# Patient Record
Sex: MALE | Race: BLACK OR AFRICAN AMERICAN | NOT HISPANIC OR LATINO | Employment: OTHER | URBAN - METROPOLITAN AREA
[De-identification: names, ages, dates, MRNs, and addresses within clinical notes are randomized per-mention and may not be internally consistent; named-entity substitution may affect disease eponyms.]

---

## 2024-08-29 ENCOUNTER — OFFICE VISIT (OUTPATIENT)
Dept: FAMILY MEDICINE | Facility: CLINIC | Age: 49
End: 2024-08-29
Payer: COMMERCIAL

## 2024-08-29 VITALS
HEART RATE: 81 BPM | BODY MASS INDEX: 25.71 KG/M2 | TEMPERATURE: 97.3 F | DIASTOLIC BLOOD PRESSURE: 83 MMHG | SYSTOLIC BLOOD PRESSURE: 126 MMHG | OXYGEN SATURATION: 97 % | RESPIRATION RATE: 16 BRPM | WEIGHT: 160 LBS | HEIGHT: 66 IN

## 2024-08-29 DIAGNOSIS — I10 HYPERTENSION GOAL BP (BLOOD PRESSURE) < 140/90: ICD-10-CM

## 2024-08-29 DIAGNOSIS — E11.65 TYPE 2 DIABETES MELLITUS WITH HYPERGLYCEMIA, WITHOUT LONG-TERM CURRENT USE OF INSULIN (H): ICD-10-CM

## 2024-08-29 DIAGNOSIS — Z12.5 PROSTATE CANCER SCREENING: ICD-10-CM

## 2024-08-29 DIAGNOSIS — M65.30 TRIGGER FINGER, ACQUIRED: ICD-10-CM

## 2024-08-29 DIAGNOSIS — Z76.89 ENCOUNTER TO ESTABLISH CARE: Primary | ICD-10-CM

## 2024-08-29 DIAGNOSIS — E78.5 HYPERLIPIDEMIA LDL GOAL <100: ICD-10-CM

## 2024-08-29 LAB
ALBUMIN SERPL BCG-MCNC: 4.8 G/DL (ref 3.5–5.2)
ALP SERPL-CCNC: 116 U/L (ref 40–150)
ALT SERPL W P-5'-P-CCNC: 68 U/L (ref 0–70)
ANION GAP SERPL CALCULATED.3IONS-SCNC: 11 MMOL/L (ref 7–15)
AST SERPL W P-5'-P-CCNC: 43 U/L (ref 0–45)
BILIRUB SERPL-MCNC: 0.5 MG/DL
BUN SERPL-MCNC: 9.3 MG/DL (ref 6–20)
CALCIUM SERPL-MCNC: 9.4 MG/DL (ref 8.8–10.4)
CHLORIDE SERPL-SCNC: 101 MMOL/L (ref 98–107)
CHOLEST SERPL-MCNC: 249 MG/DL
CREAT SERPL-MCNC: 0.71 MG/DL (ref 0.67–1.17)
CREAT UR-MCNC: 203 MG/DL
EGFRCR SERPLBLD CKD-EPI 2021: >90 ML/MIN/1.73M2
ERYTHROCYTE [DISTWIDTH] IN BLOOD BY AUTOMATED COUNT: 11.4 % (ref 10–15)
FASTING STATUS PATIENT QL REPORTED: YES
FASTING STATUS PATIENT QL REPORTED: YES
GLUCOSE SERPL-MCNC: 197 MG/DL (ref 70–99)
HBA1C MFR BLD: 8.6 % (ref 0–5.6)
HCO3 SERPL-SCNC: 25 MMOL/L (ref 22–29)
HCT VFR BLD AUTO: 45.7 % (ref 40–53)
HDLC SERPL-MCNC: 41 MG/DL
HGB BLD-MCNC: 16.5 G/DL (ref 13.3–17.7)
LDLC SERPL CALC-MCNC: 183 MG/DL
MCH RBC QN AUTO: 30.1 PG (ref 26.5–33)
MCHC RBC AUTO-ENTMCNC: 36.1 G/DL (ref 31.5–36.5)
MCV RBC AUTO: 83 FL (ref 78–100)
MICROALBUMIN UR-MCNC: 23.4 MG/L
MICROALBUMIN/CREAT UR: 11.53 MG/G CR (ref 0–17)
NONHDLC SERPL-MCNC: 208 MG/DL
PLATELET # BLD AUTO: 238 10E3/UL (ref 150–450)
POTASSIUM SERPL-SCNC: 3.5 MMOL/L (ref 3.4–5.3)
PROT SERPL-MCNC: 7.4 G/DL (ref 6.4–8.3)
PSA SERPL DL<=0.01 NG/ML-MCNC: 0.46 NG/ML (ref 0–3.5)
RBC # BLD AUTO: 5.48 10E6/UL (ref 4.4–5.9)
SODIUM SERPL-SCNC: 137 MMOL/L (ref 135–145)
TRIGL SERPL-MCNC: 127 MG/DL
WBC # BLD AUTO: 5.7 10E3/UL (ref 4–11)

## 2024-08-29 PROCEDURE — 85027 COMPLETE CBC AUTOMATED: CPT | Performed by: INTERNAL MEDICINE

## 2024-08-29 PROCEDURE — 36415 COLL VENOUS BLD VENIPUNCTURE: CPT | Performed by: INTERNAL MEDICINE

## 2024-08-29 PROCEDURE — 90471 IMMUNIZATION ADMIN: CPT | Performed by: INTERNAL MEDICINE

## 2024-08-29 PROCEDURE — G0103 PSA SCREENING: HCPCS | Performed by: INTERNAL MEDICINE

## 2024-08-29 PROCEDURE — 99203 OFFICE O/P NEW LOW 30 MIN: CPT | Mod: 25 | Performed by: INTERNAL MEDICINE

## 2024-08-29 PROCEDURE — 83036 HEMOGLOBIN GLYCOSYLATED A1C: CPT | Performed by: INTERNAL MEDICINE

## 2024-08-29 PROCEDURE — 80053 COMPREHEN METABOLIC PANEL: CPT | Performed by: INTERNAL MEDICINE

## 2024-08-29 PROCEDURE — 82570 ASSAY OF URINE CREATININE: CPT | Performed by: INTERNAL MEDICINE

## 2024-08-29 PROCEDURE — 82043 UR ALBUMIN QUANTITATIVE: CPT | Performed by: INTERNAL MEDICINE

## 2024-08-29 PROCEDURE — 80061 LIPID PANEL: CPT | Performed by: INTERNAL MEDICINE

## 2024-08-29 PROCEDURE — 90746 HEPB VACCINE 3 DOSE ADULT IM: CPT | Performed by: INTERNAL MEDICINE

## 2024-08-29 RX ORDER — AMLODIPINE BESYLATE 5 MG/1
5 TABLET ORAL DAILY
Qty: 90 TABLET | Refills: 0 | Status: SHIPPED | OUTPATIENT
Start: 2024-08-29

## 2024-08-29 RX ORDER — AMLODIPINE BESYLATE 5 MG/1
5 TABLET ORAL DAILY
COMMUNITY
Start: 2024-08-29 | End: 2024-08-29

## 2024-08-29 ASSESSMENT — PAIN SCALES - GENERAL: PAINLEVEL: MILD PAIN (3)

## 2024-08-29 NOTE — NURSING NOTE
Prior to immunization administration, verified patients identity using patient s name and date of birth. Please see Immunization Activity for additional information.     Screening Questionnaire for Adult Immunization    Are you sick today?   No   Do you have allergies to medications, food, a vaccine component or latex?   No   Have you ever had a serious reaction after receiving a vaccination?   No   Do you have a long-term health problem with heart, lung, kidney, or metabolic disease (e.g., diabetes), asthma, a blood disorder, no spleen, complement component deficiency, a cochlear implant, or a spinal fluid leak?  Are you on long-term aspirin therapy?   No   Do you have cancer, leukemia, HIV/AIDS, or any other immune system problem?   No   Do you have a parent, brother, or sister with an immune system problem?   No   In the past 3 months, have you taken medications that affect  your immune system, such as prednisone, other steroids, or anticancer drugs; drugs for the treatment of rheumatoid arthritis, Crohn s disease, or psoriasis; or have you had radiation treatments?   No   Have you had a seizure, or a brain or other nervous system problem?   No   During the past year, have you received a transfusion of blood or blood    products, or been given immune (gamma) globulin or antiviral drug?   No   For women: Are you pregnant or is there a chance you could become       pregnant during the next month?   No   Have you received any vaccinations in the past 4 weeks?   No     Immunization questionnaire answers were all negative.      Patient instructed to remain in clinic for 15 minutes afterwards, and to report any adverse reactions.     Screening performed by Louann Deal MA on 8/29/2024 at 1:27 PM.

## 2024-08-29 NOTE — PROGRESS NOTES
"  Assessment & Plan     Toshia was seen today for hypertension.    Diagnoses and all orders for this visit:    Encounter to establish care    Hyperlipidemia LDL goal <100  -     Comprehensive metabolic panel; Future  -     Lipid Profile; Future  -     Comprehensive metabolic panel  -     Lipid Profile  -     rosuvastatin (CRESTOR) 20 MG tablet; Take 1 tablet (20 mg) by mouth daily.  -     Lipid panel reflex to direct LDL Fasting; Future    Hypertension goal BP (blood pressure) < 140/90  -     amLODIPine (NORVASC) 5 MG tablet; Take 1 tablet (5 mg) by mouth daily.    Prostate cancer screening  -     PSA, screen; Future  -     PSA, screen    Trigger finger, acquired  -     Orthopedic  Referral; Future    Type 2 diabetes mellitus with hyperglycemia, without long-term current use of insulin (H)  -     Hemoglobin A1c; Future  -     CBC with platelets; Future  -     Comprehensive metabolic panel; Future  -     Albumin Random Urine Quantitative with Creat Ratio; Future  -     Hemoglobin A1c  -     CBC with platelets  -     Comprehensive metabolic panel  -     Albumin Random Urine Quantitative with Creat Ratio  -     metFORMIN (GLUCOPHAGE XR) 500 MG 24 hr tablet; Take 1 tablet (500 mg) by mouth 2 times daily (with meals).  -     Hemoglobin A1c; Future    Other orders  -     HEPATITIS B VACCINE (20 YEARS AND OLDER) (ENGERIX-B/RECOMBIVAX)  -     PRIMARY CARE FOLLOW-UP SCHEDULING; Future  -     PRIMARY CARE FOLLOW-UP SCHEDULING; Future       Diabetes is not controlled. Start metformin.   LDL not at goal. Start rosuvastatin.  Recheck A1c and lipids in 3 months.  Return in January 2025 for preventive physical.   He can schedule nurse visit for hepatitis B#2 and other vaccines.         BMI  Estimated body mass index is 26.22 kg/m  as calculated from the following:    Height as of this encounter: 1.664 m (5' 5.5\").    Weight as of this encounter: 72.6 kg (160 lb).   Weight management plan: not addressed          Subjective "   Toshia is a 49 year old, presenting for the following health issues:  Hypertension        8/29/2024    12:36 PM   Additional Questions   Roomed by Clif   Accompanied by self         8/29/2024    12:36 PM   Patient Reported Additional Medications   Patient reports taking the following new medications no       Pt moved from [facility] to the [facility] to be close to family.   He has his own business, .   He has had history of diabetes, HTN, hyperlipidemia for over 10 years.   On Amlodipine for HTN.   Diet control for diabetes.   Chose not to be on medication for cholesterol, working on diet. He is now open to take cholesterol medication if recheck is high.   He has strong family history of HTN. His father and 6 of the 9 children in his family including himself have HTN. No other disease runs in the family.     Has trigger finger of the right ring finger for about 8 years. Had injection a couple of years ago with good result but it is now coming back.     He otherwise feels healthy  He needs amlodipine refilled.   His last preventive physical is January 2024.    History of Present Illness       Diabetes:   He presents for follow up of diabetes.    He is not checking blood glucose.        He is concerned about other.    He is not experiencing numbness or burning in feet, excessive thirst, blurry vision, weight changes or redness, sores or blisters on feet.           Hyperlipidemia:  He presents for follow up of hyperlipidemia.   He is not taking medication to lower cholesterol. He is not having myalgia or other side effects to statin medications.    Hypertension: He presents for follow up of hypertension.  He does check blood pressure  regularly outside of the clinic. Outside blood pressures have been over 140/90. He follows a low salt diet.     He eats 0-1 servings of fruits and vegetables daily.He consumes 0 sweetened beverage(s) daily.He exercises with enough effort to increase his heart rate 20 to 29  "minutes per day.  He exercises with enough effort to increase his heart rate 3 or less days per week.   He is taking medications regularly.         Hypertension Follow-up    Do you check your blood pressure regularly outside of the clinic? Yes   Are you following a low salt diet? Yes  Are your blood pressures ever more than 140 on the top number (systolic) OR more   than 90 on the bottom number (diastolic), for example 140/90? Yes  How many servings of fruits and vegetables do you eat daily? 0-1   On average, how many sweetened beverages do you drink each day (Examples: soda, juice, sweet tea, etc.  Do NOT count diet or artificially sweetened beverages)? 0   How many minutes a day do you exercise enough to make your heart beat faster? 20 to 29   How many days a week do you exercise enough to make your heart beat faster? 3 or less   How many days per week do you miss taking your medication? 0         Review of Systems  Constitutional, HEENT, cardiovascular, pulmonary, gi and gu systems are negative, except as otherwise noted.      Objective    /83 (BP Location: Right arm, Patient Position: Sitting, Cuff Size: Adult Regular)   Pulse 81   Temp 97.3  F (36.3  C) (Temporal)   Resp 16   Ht 1.664 m (5' 5.5\")   Wt 72.6 kg (160 lb)   SpO2 97%   BMI 26.22 kg/m    Body mass index is 26.22 kg/m . Second BP: 126/83  Physical Exam   GENERAL: alert and no distress    Results for orders placed or performed in visit on 08/29/24   Hemoglobin A1c     Status: Abnormal   Result Value Ref Range    Hemoglobin A1C 8.6 (H) 0.0 - 5.6 %   CBC with platelets     Status: Normal   Result Value Ref Range    WBC Count 5.7 4.0 - 11.0 10e3/uL    RBC Count 5.48 4.40 - 5.90 10e6/uL    Hemoglobin 16.5 13.3 - 17.7 g/dL    Hematocrit 45.7 40.0 - 53.0 %    MCV 83 78 - 100 fL    MCH 30.1 26.5 - 33.0 pg    MCHC 36.1 31.5 - 36.5 g/dL    RDW 11.4 10.0 - 15.0 %    Platelet Count 238 150 - 450 10e3/uL   Comprehensive metabolic panel     Status: " Abnormal   Result Value Ref Range    Sodium 137 135 - 145 mmol/L    Potassium 3.5 3.4 - 5.3 mmol/L    Carbon Dioxide (CO2) 25 22 - 29 mmol/L    Anion Gap 11 7 - 15 mmol/L    Urea Nitrogen 9.3 6.0 - 20.0 mg/dL    Creatinine 0.71 0.67 - 1.17 mg/dL    GFR Estimate >90 >60 mL/min/1.73m2    Calcium 9.4 8.8 - 10.4 mg/dL    Chloride 101 98 - 107 mmol/L    Glucose 197 (H) 70 - 99 mg/dL    Alkaline Phosphatase 116 40 - 150 U/L    AST 43 0 - 45 U/L    ALT 68 0 - 70 U/L    Protein Total 7.4 6.4 - 8.3 g/dL    Albumin 4.8 3.5 - 5.2 g/dL    Bilirubin Total 0.5 <=1.2 mg/dL    Patient Fasting > 8hrs? Yes    Albumin Random Urine Quantitative with Creat Ratio     Status: None   Result Value Ref Range    Creatinine Urine mg/dL 203.0 mg/dL    Albumin Urine mg/L 23.4 mg/L    Albumin Urine mg/g Cr 11.53 0.00 - 17.00 mg/g Cr   Lipid Profile     Status: Abnormal   Result Value Ref Range    Cholesterol 249 (H) <200 mg/dL    Triglycerides 127 <150 mg/dL    Direct Measure HDL 41 >=40 mg/dL    LDL Cholesterol Calculated 183 (H) <=100 mg/dL    Non HDL Cholesterol 208 (H) <130 mg/dL    Patient Fasting > 8hrs? Yes     Narrative    Cholesterol  Desirable:  <200 mg/dL    Triglycerides  Normal:  Less than 150 mg/dL  Borderline High:  150-199 mg/dL  High:  200-499 mg/dL  Very High:  Greater than or equal to 500 mg/dL    Direct Measure HDL  Female:  Greater than or equal to 50 mg/dL   Male:  Greater than or equal to 40 mg/dL    LDL Cholesterol  Desirable:  <100mg/dL  Above Desirable:  100-129 mg/dL   Borderline High:  130-159 mg/dL   High:  160-189 mg/dL   Very High:  >= 190 mg/dL    Non HDL Cholesterol  Desirable:  130 mg/dL  Above Desirable:  130-159 mg/dL  Borderline High:  160-189 mg/dL  High:  190-219 mg/dL  Very High:  Greater than or equal to 220 mg/dL   PSA, screen     Status: Normal   Result Value Ref Range    Prostate Specific Antigen Screen 0.46 0.00 - 3.50 ng/mL    Narrative    This result is obtained using the Roche Elecsys total PSA  method on the daja [id number] immunoassay analyzer. Results obtained with different assay methods or kits cannot be used interchangeably.           Signed Electronically by: Abimael Bills MD PhD

## 2024-08-30 DIAGNOSIS — E78.5 HYPERLIPIDEMIA LDL GOAL <100: ICD-10-CM

## 2024-08-30 PROBLEM — E11.9 DIABETES MELLITUS, TYPE 2 (H): Status: ACTIVE | Noted: 2024-08-30

## 2024-08-30 RX ORDER — METFORMIN HCL 500 MG
500 TABLET, EXTENDED RELEASE 24 HR ORAL 2 TIMES DAILY WITH MEALS
Qty: 180 TABLET | Refills: 0 | Status: SHIPPED | OUTPATIENT
Start: 2024-08-30

## 2024-08-30 RX ORDER — ROSUVASTATIN CALCIUM 20 MG/1
20 TABLET, COATED ORAL DAILY
Qty: 90 TABLET | Refills: 0 | Status: SHIPPED | OUTPATIENT
Start: 2024-08-30 | End: 2024-09-02

## 2024-09-02 RX ORDER — ATORVASTATIN CALCIUM 40 MG/1
20 TABLET, FILM COATED ORAL DAILY
Qty: 90 TABLET | Refills: 0 | Status: SHIPPED | OUTPATIENT
Start: 2024-09-02

## 2024-09-03 ENCOUNTER — OFFICE VISIT (OUTPATIENT)
Dept: ORTHOPEDICS | Facility: CLINIC | Age: 49
End: 2024-09-03
Payer: COMMERCIAL

## 2024-09-03 DIAGNOSIS — M65.341 TRIGGER RING FINGER OF RIGHT HAND: Primary | ICD-10-CM

## 2024-09-03 PROCEDURE — 20550 NJX 1 TENDON SHEATH/LIGAMENT: CPT | Mod: F8 | Performed by: FAMILY MEDICINE

## 2024-09-03 PROCEDURE — 99203 OFFICE O/P NEW LOW 30 MIN: CPT | Mod: 25 | Performed by: FAMILY MEDICINE

## 2024-09-03 RX ORDER — ROPIVACAINE HYDROCHLORIDE 5 MG/ML
0.5 INJECTION, SOLUTION EPIDURAL; INFILTRATION; PERINEURAL
Status: SHIPPED | OUTPATIENT
Start: 2024-09-03

## 2024-09-03 RX ORDER — BETAMETHASONE SODIUM PHOSPHATE AND BETAMETHASONE ACETATE 3; 3 MG/ML; MG/ML
6 INJECTION, SUSPENSION INTRA-ARTICULAR; INTRALESIONAL; INTRAMUSCULAR; SOFT TISSUE
Status: SHIPPED | OUTPATIENT
Start: 2024-09-03

## 2024-09-03 RX ADMIN — BETAMETHASONE SODIUM PHOSPHATE AND BETAMETHASONE ACETATE 6 MG: 3; 3 INJECTION, SUSPENSION INTRA-ARTICULAR; INTRALESIONAL; INTRAMUSCULAR; SOFT TISSUE at 10:52

## 2024-09-03 RX ADMIN — ROPIVACAINE HYDROCHLORIDE 0.5 ML: 5 INJECTION, SOLUTION EPIDURAL; INFILTRATION; PERINEURAL at 10:52

## 2024-09-03 NOTE — LETTER
9/3/2024      Toshia Hutchins  [address] [place] [address]      Dear Colleague,    Thank you for referring your patient, Toshia Hutchins, to the [facility]. Please see a copy of my visit note below.    ASSESSMENT & PLAN    Toshia was seen today for pain.    Diagnoses and all orders for this visit:    Trigger ring finger of right hand  -     Hand / Upper Extremity Injection/Arthrocentesis: R ring A1      This issue is acute on chronic and Worsening.    # Right 4th Digit Trigger Finger: Toshia Hutchins  was seen today for right 4th digit. Symptoms had been going on for 10+ years worsening over the past few months. On examination there are positive findings of triggering over the 4th digit. No new imaging. Likely cause of patient's condition due to trigger finger 4th digit.   Counseled patient on nature of condition and treatment options.  Given this plan as below, follow-up as needed.     Image Findings: none  Treatment: Activities as tolerated, home exercises given today  Job: As tolerated  Medications/Injections: Limited tylenol/ibuprofen for pain for 1-2 weeks,Topical Voltaren gel,  right 4th digit trigger finger steroid injection   Follow-up: In one month if symptoms do not improve, sooner if worsening  Can consider repeat evaluation    Raúl Diop MD  [facility]    -----  Chief Complaint   Patient presents with     Right Hand - Pain       SUBJECTIVE  Toshia Hutchins is a/an 49 year old male who is seen in consultation at the request of  Abimael Bills MD, PhD for evaluation of right hand pain.     The patient is seen by themselves.  The patient is Right handed    Onset: 10+ years(s) ago. Reports insidious onset without acute precipitating event. New flare over last 10 months.   Location of Pain: right ring finger   Worsened by: morning   Better with: steroid injection  Treatments tried:  previous injection provided good relief for 2-3  years  Associated symptoms: locking    Orthopedic/Surgical history: YES -chronic finger pain  Social History/Occupation: own business, .     No family history pertinent to patient's problem today.      REVIEW OF SYSTEMS:  Review of Systems  Constitutional, HEENT, cardiovascular, pulmonary, gi and gu systems are negative, except as otherwise noted.    OBJECTIVE:  There were no vitals taken for this visit.   General: healthy, alert and in no distress  HEENT: no scleral icterus or conjunctival erythema  Skin: no suspicious lesions or rash. No jaundice.  CV: distal perfusion intact    Resp: normal respiratory effort without conversational dyspnea   Psych: normal mood and affect  Gait: normal steady gait with appropriate coordination and balance    Neuro: Normal light sensory exam of right upper extremity     Ortho Exam   RIGHT HAND  Inspection:    No swelling, bruising, discoloration, or obvious deformity or asymmetry  Palpation:   Carpals: normal   Metacarpals: normal   Thumb: normal   Fingers: triggering 4th digit  Range of Motion:    Full active flexion and extension at MCP, PIP, and DIP joints; normal finger cascade without malrotation.  Wrist pronation, supination, and ulnar/radial deviation normal.  Strength:     full  Special Tests:    Positive: palpable triggering 4th digit    Negative: flexor digitorum superficialis testing, flexor digitorum profundus testing    RADIOLOGY:  No new imaging       Disclaimer: This note consists of symbols derived from keyboarding, dictation and/or voice recognition software. As a result, there may be errors in the script that have gone undetected. Please consider this when interpreting information found in this chart.    Hand / Upper Extremity Injection/Arthrocentesis: R ring A1    Date/Time: 9/3/2024 10:52 AM    Performed by: Raúl Diop MD  Authorized by: Raúl Diop MD    Indications:  Pain and therapeutic  Needle Size:  25 G  Guidance: landmark     Approach:  Volar  Condition: trigger finger    Location:  Ring finger    Site:  R ring A1  Medications:  6 mg betamethasone acet & sod phos 6 (3-3) MG/ML; 0.5 mL ROPivacaine 5 MG/ML  Medications comment:  Actual amount of celestone used 0.5 mL  Outcome:  Tolerated well, no immediate complications  Procedure discussed: discussed risks, benefits, and alternatives    Consent Given by:  Patient  Timeout: timeout called immediately prior to procedure    Prep: patient was prepped and draped in usual sterile fashion     Ultrasound images of procedure were permanently stored.     Patient tolerated right 4th digit trigger finger steroid injection.  Ultrasound guided images were permanently stored.   Aftercare instructions given to patient.  Plan to follow-up as discussed above.     Raúl Diop MD [facility]            Again, thank you for allowing me to participate in the care of your patient.        Sincerely,        Raúl Diop MD

## 2024-09-03 NOTE — PROGRESS NOTES
ASSESSMENT & PLAN    Toshia was seen today for pain.    Diagnoses and all orders for this visit:    Trigger ring finger of right hand  -     Hand / Upper Extremity Injection/Arthrocentesis: R ring A1      This issue is acute on chronic and Worsening.    # Right 4th Digit Trigger Finger: Toshia Hutchins  was seen today for right 4th digit. Symptoms had been going on for 10+ years worsening over the past few months. On examination there are positive findings of triggering over the 4th digit. No new imaging. Likely cause of patient's condition due to trigger finger 4th digit.   Counseled patient on nature of condition and treatment options.  Given this plan as below, follow-up as needed.     Image Findings: none  Treatment: Activities as tolerated, home exercises given today  Job: As tolerated  Medications/Injections: Limited tylenol/ibuprofen for pain for 1-2 weeks,Topical Voltaren gel,  right 4th digit trigger finger steroid injection   Follow-up: In one month if symptoms do not improve, sooner if worsening  Can consider repeat evaluation    Raúl Diop MD  [facility] [place]    -----  Chief Complaint   Patient presents with    Right Hand - Pain       SUBJECTIVE  Toshia Hutchins is a/an 49 year old male who is seen in consultation at the request of  Abimael Bills MD, PhD for evaluation of right hand pain.     The patient is seen by themselves.  The patient is Right handed    Onset: 10+ years(s) ago. Reports insidious onset without acute precipitating event. New flare over last 10 months.   Location of Pain: right ring finger   Worsened by: morning   Better with: steroid injection  Treatments tried:  previous injection provided good relief for 2-3 years  Associated symptoms: locking    Orthopedic/Surgical history: YES -chronic finger pain  Social History/Occupation: own business, .     No family history pertinent to patient's problem today.      REVIEW OF SYSTEMS:  Review of  Systems  Constitutional, HEENT, cardiovascular, pulmonary, gi and gu systems are negative, except as otherwise noted.    OBJECTIVE:  There were no vitals taken for this visit.   General: healthy, alert and in no distress  HEENT: no scleral icterus or conjunctival erythema  Skin: no suspicious lesions or rash. No jaundice.  CV: distal perfusion intact    Resp: normal respiratory effort without conversational dyspnea   Psych: normal mood and affect  Gait: normal steady gait with appropriate coordination and balance    Neuro: Normal light sensory exam of right upper extremity     Ortho Exam   RIGHT HAND  Inspection:    No swelling, bruising, discoloration, or obvious deformity or asymmetry  Palpation:   Carpals: normal   Metacarpals: normal   Thumb: normal   Fingers: triggering 4th digit  Range of Motion:    Full active flexion and extension at MCP, PIP, and DIP joints; normal finger cascade without malrotation.  Wrist pronation, supination, and ulnar/radial deviation normal.  Strength:     full  Special Tests:    Positive: palpable triggering 4th digit    Negative: flexor digitorum superficialis testing, flexor digitorum profundus testing    RADIOLOGY:  No new imaging       Disclaimer: This note consists of symbols derived from keyboarding, dictation and/or voice recognition software. As a result, there may be errors in the script that have gone undetected. Please consider this when interpreting information found in this chart.    Hand / Upper Extremity Injection/Arthrocentesis: R ring A1    Date/Time: 9/3/2024 10:52 AM    Performed by: Raúl Diop MD  Authorized by: Raúl Diop MD    Indications:  Pain and therapeutic  Needle Size:  25 G  Guidance: landmark    Approach:  Volar  Condition: trigger finger    Location:  Ring finger    Site:  R ring A1  Medications:  6 mg betamethasone acet & sod phos 6 (3-3) MG/ML; 0.5 mL ROPivacaine 5 MG/ML  Medications comment:  Actual amount of celestone used 0.5  mL  Outcome:  Tolerated well, no immediate complications  Procedure discussed: discussed risks, benefits, and alternatives    Consent Given by:  Patient  Timeout: timeout called immediately prior to procedure    Prep: patient was prepped and draped in usual sterile fashion     Ultrasound images of procedure were permanently stored.     Patient tolerated right 4th digit trigger finger steroid injection.  Ultrasound guided images were permanently stored.   Aftercare instructions given to patient.  Plan to follow-up as discussed above.     Raúl Diop MD [facility]

## 2024-09-03 NOTE — PATIENT INSTRUCTIONS
# Right 4th Digit Trigger Finger: Toshia Hutchins  was seen today for right 4th digit. Symptoms had been going on for 10+ years worsening over the past few months. On examination there are positive findings of triggering over the 4th digit. No new imaging. Likely cause of patient's condition due to trigger finger 4th digit.   Counseled patient on nature of condition and treatment options.  Given this plan as below, follow-up as needed.     Image Findings: none  Treatment: Activities as tolerated, home exercises given today  Job: As tolerated  Medications/Injections: Limited tylenol/ibuprofen for pain for 1-2 weeks,Topical Voltaren gel,  right 4th digit trigger finger steroid injection   Follow-up: In one month if symptoms do not improve, sooner if worsening  Can consider repeat evaluation    Please call [phone]   Ask for my team if you have any questions or concerns    If you have not yet received the influenza vaccine but would like to get one, please call  [phone] or you can schedule via [facility]    It was great seeing you today!    Raúl Diop MD, CAM     FS Injection Discharge Instructions    Procedure: right 4th digit trigger finger steroid injection     You may shower, however avoid swimming, tub baths or hot tubs for 24 hours following your procedure  You may have a mild to moderate increase in pain for several days following the injection.  It may take up to 14 days for the steroid medication to start working although you may feel the effect as early as a few days after the procedure.  You may use ice packs for 10-15 minutes, 3 to 4 times a day at the injection site for comfort  You may use anti-inflammatory medications (such as Ibuprofen or Aleve or Advil) or Tylenol for pain control if necessary  If you were fasting, you may resume your normal diet and medications after the procedure  If you have diabetes, check your blood sugar more frequently than usual as your blood sugar may be  higher than normal for 10-14 days following a steroid injection. Contact your doctor who manages your diabetes if your blood sugar is higher than usual    If you experience any of the following, call [facility] @ [phone] or [phone]  -Fever over 100 degree F  -Swelling, bleeding, redness, drainage, warmth at the injection site  - New or worsening pain

## 2024-10-03 ENCOUNTER — ALLIED HEALTH/NURSE VISIT (OUTPATIENT)
Dept: FAMILY MEDICINE | Facility: CLINIC | Age: 49
End: 2024-10-03
Attending: INTERNAL MEDICINE
Payer: COMMERCIAL

## 2024-10-03 VITALS — DIASTOLIC BLOOD PRESSURE: 80 MMHG | SYSTOLIC BLOOD PRESSURE: 138 MMHG

## 2024-10-03 DIAGNOSIS — Z23 NEED FOR HEPATITIS B BOOSTER VACCINATION: Primary | ICD-10-CM

## 2024-10-03 PROCEDURE — 90471 IMMUNIZATION ADMIN: CPT

## 2024-10-03 PROCEDURE — 99207 PR NO CHARGE NURSE ONLY: CPT

## 2024-10-03 PROCEDURE — 90746 HEPB VACCINE 3 DOSE ADULT IM: CPT

## 2024-10-03 NOTE — PROGRESS NOTES
Prior to immunization administration, verified patients identity using patient s name and date of birth. Please see Immunization Activity for additional information.     Screening Questionnaire for Adult Immunization    Are you sick today?   No   Do you have allergies to medications, food, a vaccine component or latex?   No   Have you ever had a serious reaction after receiving a vaccination?   No   Do you have a long-term health problem with heart, lung, kidney, or metabolic disease (e.g., diabetes), asthma, a blood disorder, no spleen, complement component deficiency, a cochlear implant, or a spinal fluid leak?  Are you on long-term aspirin therapy?   No   Do you have cancer, leukemia, HIV/AIDS, or any other immune system problem?   No   Do you have a parent, brother, or sister with an immune system problem?   No   In the past 3 months, have you taken medications that affect  your immune system, such as prednisone, other steroids, or anticancer drugs; drugs for the treatment of rheumatoid arthritis, Crohn s disease, or psoriasis; or have you had radiation treatments?   No   Have you had a seizure, or a brain or other nervous system problem?   No   During the past year, have you received a transfusion of blood or blood    products, or been given immune (gamma) globulin or antiviral drug?   No   For women: Are you pregnant or is there a chance you could become       pregnant during the next month?   No   Have you received any vaccinations in the past 4 weeks?   No     Immunization questionnaire answers were all negative.    I have reviewed the following standing orders:   This patient is due and qualifies for the Hepatitis B vaccine.    Click here for Hepatitis B Standing Order    I have reviewed the vaccines inclusion and exclusion criteria; No concerns regarding eligibility.     Patient instructed to remain in clinic for 15 minutes afterwards, and to report any adverse reactions.     Screening performed by [place]  NAKIA Castellon on 10/3/2024 at 10:03 AM.

## 2024-10-06 ENCOUNTER — HEALTH MAINTENANCE LETTER (OUTPATIENT)
Age: 49
End: 2024-10-06

## 2024-11-24 DIAGNOSIS — I10 HYPERTENSION GOAL BP (BLOOD PRESSURE) < 140/90: ICD-10-CM

## 2024-11-25 DIAGNOSIS — E11.65 TYPE 2 DIABETES MELLITUS WITH HYPERGLYCEMIA, WITHOUT LONG-TERM CURRENT USE OF INSULIN (H): ICD-10-CM

## 2024-11-25 RX ORDER — AMLODIPINE BESYLATE 5 MG/1
5 TABLET ORAL DAILY
Qty: 90 TABLET | Refills: 0 | Status: SHIPPED | OUTPATIENT
Start: 2024-11-25

## 2024-11-25 RX ORDER — METFORMIN HYDROCHLORIDE 500 MG/1
500 TABLET, EXTENDED RELEASE ORAL 2 TIMES DAILY WITH MEALS
Qty: 180 TABLET | Refills: 1 | Status: SHIPPED | OUTPATIENT
Start: 2024-11-25

## 2024-11-25 NOTE — TELEPHONE ENCOUNTER
Prescription approved per [facility] Refill Protocol.  Sierra Plasencia RN  [facility]

## 2024-12-15 ENCOUNTER — HEALTH MAINTENANCE LETTER (OUTPATIENT)
Age: 49
End: 2024-12-15

## 2024-12-19 ENCOUNTER — LAB (OUTPATIENT)
Dept: LAB | Facility: CLINIC | Age: 49
End: 2024-12-19
Attending: INTERNAL MEDICINE
Payer: COMMERCIAL

## 2024-12-19 DIAGNOSIS — E11.65 TYPE 2 DIABETES MELLITUS WITH HYPERGLYCEMIA, WITHOUT LONG-TERM CURRENT USE OF INSULIN (H): ICD-10-CM

## 2024-12-19 DIAGNOSIS — E78.5 HYPERLIPIDEMIA LDL GOAL <100: ICD-10-CM

## 2024-12-19 LAB
CHOLEST SERPL-MCNC: 161 MG/DL
EST. AVERAGE GLUCOSE BLD GHB EST-MCNC: 126 MG/DL
FASTING STATUS PATIENT QL REPORTED: YES
HBA1C MFR BLD: 6 % (ref 0–5.6)
HDLC SERPL-MCNC: 43 MG/DL
LDLC SERPL CALC-MCNC: 100 MG/DL
NONHDLC SERPL-MCNC: 118 MG/DL
TRIGL SERPL-MCNC: 91 MG/DL

## 2025-01-17 ENCOUNTER — OFFICE VISIT (OUTPATIENT)
Dept: FAMILY MEDICINE | Facility: CLINIC | Age: 50
End: 2025-01-17
Payer: COMMERCIAL

## 2025-01-17 VITALS
TEMPERATURE: 97 F | SYSTOLIC BLOOD PRESSURE: 134 MMHG | WEIGHT: 154.4 LBS | OXYGEN SATURATION: 100 % | DIASTOLIC BLOOD PRESSURE: 83 MMHG | HEIGHT: 65 IN | HEART RATE: 78 BPM | RESPIRATION RATE: 16 BRPM | BODY MASS INDEX: 25.72 KG/M2

## 2025-01-17 DIAGNOSIS — Z09 HOSPITAL DISCHARGE FOLLOW-UP: Primary | ICD-10-CM

## 2025-01-17 DIAGNOSIS — R91.8 PULMONARY NODULES: ICD-10-CM

## 2025-01-17 PROCEDURE — 99496 TRANSJ CARE MGMT HIGH F2F 7D: CPT

## 2025-01-17 NOTE — PROGRESS NOTES
Assessment & Plan     Hospital discharge follow-up  - Improved, no new symptoms  - Discussed return criteria     Pulmonary nodules  -Repeat CT in 1 year as discussed          MED REC REQUIRED  Post Medication Reconciliation Status: discharge medications reconciled, continue medications without change    Follow up with PCP for routine annual as discussed and if symptoms recur.       Basilio Pope is a 49 year old, presenting for the following health issues:    Pt reports for hosp follow up. Admitted for ABD pain N/V- CT showed acute cholecystitis. Pain resolved overnight and pt deferred surgery.     Since discharge- pt denies pain, N/V/D, fever, CP, SOB. Education provided on return criteria. All questions answered. He does note some intermittent lower abd discomfort when laying on side but reports this is improving. Started while in patient. Is scheduled for colonoscopy in JAN    Of note- pulm nodules noted on CT. Pt is aware of this and is planning for repeat imaging. Declines assistance in scheduling today        1/17/2025     2:38 PM   Additional Questions   Roomed by АННА ZUÑIGA         Hospital Follow-up Visit:    Hospital/Nursing Home/IP Rehab Facility:  [facility]  Date of Admission: 1/10/25  Date of Discharge: 1/12/25  Reason(s) for Admission: Acute cholecystitis (Primary Dx)  Was the patient in the ICU or did the patient experience delirium during hospitalization?  No  Do you have any other stressors you would like to discuss with your provider? No    Problems taking medications regularly:  None  Medication changes since discharge: None  Problems adhering to non-medication therapy:  None    Summary of hospitalization:  CareEverywhere information obtained and reviewed  Diagnostic Tests/Treatments reviewed.  Follow up needed: Repeat CT for lung nodule monitoring  Other Healthcare Providers Involved in Patient s Care:         None  Update since discharge: improved.         Plan of care communicated with  "patient                       Objective    /83   Pulse 78   Temp 97  F (36.1  C) (Temporal)   Resp 16   Ht 1.651 m (5' 5\")   Wt 70 kg (154 lb 6.4 oz)   SpO2 100%   BMI 25.69 kg/m    Body mass index is 25.69 kg/m .  Physical Exam   GENERAL: healthy, alert and no distress  EYES: Eyes grossly normal to inspection, PERRL and conjunctivae and sclerae normal  Neck: No visible JVD or lymphadenopathy   RESP: symmetrical rise in chest   CV: No peripheral edema notable   MS: no gross musculoskeletal defects noted  SKIN: no suspicious lesions or rashes  PSYCH: mentation appears normal, affect normal/bright              Signed Electronically by: CECILIA Pina CNP    "

## 2025-01-20 ENCOUNTER — TELEPHONE (OUTPATIENT)
Dept: GASTROENTEROLOGY | Facility: CLINIC | Age: 50
End: 2025-01-20
Payer: COMMERCIAL

## 2025-01-20 DIAGNOSIS — Z12.11 ENCOUNTER FOR SCREENING COLONOSCOPY: Primary | ICD-10-CM

## 2025-01-20 RX ORDER — BISACODYL 5 MG/1
TABLET, DELAYED RELEASE ORAL
Qty: 4 TABLET | Refills: 0 | Status: SHIPPED | OUTPATIENT
Start: 2025-01-20

## 2025-01-20 NOTE — TELEPHONE ENCOUNTER
Pre visit planning completed.      Procedure details:    Patient scheduled for Colonoscopy on 1/30/25.     Arrival time: 1015. Procedure time 1100    Facility location: [facility]; [facility] [place] [address]. Check in location: [facility]st [facility].     Sedation type: Conscious sedation     Pre op exam needed? No.    Indication for procedure: Screening      Chart review:     Electronic implanted devices? No    Recent diagnosis of diverticulitis within the last 6 weeks? No      Medication review:    Diabetic? Yes. Oral diabetic medications: Metformin (glucophage): HOLD day of procedure.    Anticoagulants? No    Weight loss medication/injectable? No GLP-1 medication per patient's medication list. Nursing to verify with pre-assessment call.    Other medication HOLDING recommendations:  N/A      Prep for procedure:     Bowel prep recommendation: Standard Golytely. Bowel prep sent to [facility] [address] [place] [address] [address]  Due to: diabetes    Procedure information and instructions sent via mia Moise RN  Endoscopy Procedure Pre Assessment   [phone] option 2

## 2025-01-20 NOTE — TELEPHONE ENCOUNTER
Pre assessment completed for upcoming procedure.   (Please see previous telephone encounter notes for complete details)      Procedure details:    Arrival time and facility location reviewed.    Pre op exam needed? No.    Designated  policy reviewed. Instructed to have someone stay 6 hours post procedure.       Medication review:    Medications reviewed. Please see supporting documentation below. Holding recommendations discussed (if applicable).   Oral diabetic medication(s): Metformin (glucophage): HOLD day of procedure.      Prep for procedure:     Procedure prep instructions reviewed.        Any additional information needed:  N/A      Patient verbalized understanding and had no questions or concerns at this time.      Rosita Moise RN  Endoscopy Procedure Pre Assessment   [phone] option 2

## 2025-01-24 PROBLEM — E78.5 HYPERLIPIDEMIA DUE TO TYPE 2 DIABETES MELLITUS (H): Status: ACTIVE | Noted: 2025-01-24

## 2025-01-24 PROBLEM — E11.69 HYPERLIPIDEMIA DUE TO TYPE 2 DIABETES MELLITUS (H): Status: ACTIVE | Noted: 2025-01-24

## 2025-01-29 ENCOUNTER — TELEPHONE (OUTPATIENT)
Dept: GASTROENTEROLOGY | Facility: CLINIC | Age: 50
End: 2025-01-29
Payer: COMMERCIAL

## 2025-01-29 NOTE — TELEPHONE ENCOUNTER
Caller: Toshia    Reason for Reschedule/Cancellation (please be detailed, any staff messages or encounters to note?):   Pt had emergency    Did you cancel or rescheduled an EUS procedure? No.    Is screening questionnaire older than 3 months from the reschedule date.   If Yes, please complete screening questionnaire. No    Prior to reschedule please review:  Ordering Provider: Abimael Bills MD PhD  Sedation Determined: CS  Does patient have any ASC Exclusions, please identify?: n    Notes on Cancelled Procedure:  Procedure: Lower Endoscopy [Colonoscopy]   Date: 01/30/2025  Location: [facility]; [address] [place] [address]   Surgeon: Demetri    Rescheduled: Yes,   Procedure: Lower Endoscopy [Colonoscopy]    Date: 04/01/2025   Location: [facility]; [address] [place] [address]    Surgeon: Sun   Sedation Level Scheduled  CS ,  Reason for Sedation Level Order   Instructions updated and sent: Y2     Does patient need PAC or Pre -Op Rescheduled? : n

## 2025-02-25 ENCOUNTER — LAB (OUTPATIENT)
Dept: LAB | Facility: CLINIC | Age: 50
End: 2025-02-25
Payer: COMMERCIAL

## 2025-02-25 DIAGNOSIS — Z00.00 ROUTINE GENERAL MEDICAL EXAMINATION AT A HEALTH CARE FACILITY: ICD-10-CM

## 2025-02-25 DIAGNOSIS — Z11.59 NEED FOR HEPATITIS C SCREENING TEST: ICD-10-CM

## 2025-02-25 DIAGNOSIS — R74.8 ELEVATED SERUM GGT LEVEL: ICD-10-CM

## 2025-02-25 LAB
GGT SERPL-CCNC: 71 U/L (ref 8–61)
HCV AB SERPL QL IA: NONREACTIVE

## 2025-02-25 PROCEDURE — 36415 COLL VENOUS BLD VENIPUNCTURE: CPT

## 2025-02-25 PROCEDURE — 86803 HEPATITIS C AB TEST: CPT

## 2025-02-25 PROCEDURE — 82977 ASSAY OF GGT: CPT

## 2025-02-26 NOTE — RESULT ENCOUNTER NOTE
Tim Pope,    I have had the opportunity to review your recent results and an interpretation is as follows:  Your follow up GGT level returned elevated again and recheck in 6 weeks is recommended      Sincerely,  Kenny Lange MD

## 2025-03-05 ENCOUNTER — OFFICE VISIT (OUTPATIENT)
Dept: ORTHOPEDICS | Facility: CLINIC | Age: 50
End: 2025-03-05
Payer: COMMERCIAL

## 2025-03-05 DIAGNOSIS — M65.341 TRIGGER RING FINGER OF RIGHT HAND: Primary | ICD-10-CM

## 2025-03-05 PROCEDURE — 99213 OFFICE O/P EST LOW 20 MIN: CPT | Mod: 25 | Performed by: FAMILY MEDICINE

## 2025-03-05 PROCEDURE — 76942 ECHO GUIDE FOR BIOPSY: CPT | Performed by: FAMILY MEDICINE

## 2025-03-05 PROCEDURE — 20550 NJX 1 TENDON SHEATH/LIGAMENT: CPT | Mod: F8 | Performed by: FAMILY MEDICINE

## 2025-03-05 RX ORDER — LIDOCAINE HYDROCHLORIDE 10 MG/ML
1 INJECTION, SOLUTION INFILTRATION; PERINEURAL
Status: COMPLETED | OUTPATIENT
Start: 2025-03-05 | End: 2025-03-05

## 2025-03-05 RX ORDER — METHYLPREDNISOLONE ACETATE 40 MG/ML
20 INJECTION, SUSPENSION INTRA-ARTICULAR; INTRALESIONAL; INTRAMUSCULAR; SOFT TISSUE
Status: COMPLETED | OUTPATIENT
Start: 2025-03-05 | End: 2025-03-05

## 2025-03-05 RX ADMIN — METHYLPREDNISOLONE ACETATE 20 MG: 40 INJECTION, SUSPENSION INTRA-ARTICULAR; INTRALESIONAL; INTRAMUSCULAR; SOFT TISSUE at 11:53

## 2025-03-05 RX ADMIN — LIDOCAINE HYDROCHLORIDE 1 ML: 10 INJECTION, SOLUTION INFILTRATION; PERINEURAL at 11:53

## 2025-03-05 NOTE — PROGRESS NOTES
CHIEF COMPLAINT:  Pain of the Right Hand     HISTORY OF PRESENT ILLNESS  Mr. Hutchins is a pleasant 49 year old year old male who presents to clinic today with right hand ring finger.  Toshia explains that he previously had an injection in [place] several years ago and more recently saw Dr. Diop for a trigger finger injection but this only lasted 2 months. This was on 9/3/24.    Onset: gradual  Location: right ring finger  Quality:  aching and dull  Duration: 10 years +  Severity: 8/10 at worst  Timing: Intermittent episodes with sleeping, morning  Modifying factors:  resting and non-use makes it better, movement and use makes it worse  Associated signs & symptoms: pain and tenderness  Previous similar pain: No  Treatments to date: Cortisone Injection - 2 months relief, Voltaren, Oval 8 splint    Additional history: as documented    Review of Systems: A 10-point review of systems was obtained and is negative except for as noted in the HPI.       MEDICAL HISTORY  Patient Active Problem List   Diagnosis    Diabetes mellitus, type 2 (H)    Hyperlipidemia due to type 2 diabetes mellitus (H)       Current Outpatient Medications   Medication Sig Dispense Refill    amLODIPine (NORVASC) 5 MG tablet Take 1 tablet (5 mg) by mouth daily. 90 tablet 3    atorvastatin (LIPITOR) 20 MG tablet Take 1 tablet (20 mg) by mouth daily. 90 tablet 3    bisacodyl (DULCOLAX) 5 MG EC tablet Take 2 tablets at 3 pm the day before your procedure. If your procedure is before 11 am, take 2 additional tablets at 11 pm. If your procedure is after 11 am, take 2 additional tablets at 6 am. For additional instructions refer to your colonoscopy prep instructions. 4 tablet 0    metFORMIN (GLUCOPHAGE XR) 500 MG 24 hr tablet TAKE 1 TABLET BY MOUTH TWICE A DAY WITH MEALS 180 tablet 1    polyethylene glycol (GOLYTELY) 236 g suspension The night before the exam at 6 pm drink an 8-ounce glass every 15 minutes until the jug is half empty. If you arrive before  11 AM: Drink the other half of the Golytely jug at 11 PM night before procedure. If you arrive after 11 AM: Drink the other half of the Golytely jug at 6 AM day of procedure. For additional instructions refer to your colonoscopy prep instructions. (Patient not taking: Reported on 2/7/2025) 4000 mL 0       No Known Allergies    Family History   Problem Relation Age of Onset    No Known Problems Mother     Hypertension Father        Additional medical/Social/Surgical histories reviewed in [facility] and updated as appropriate.       PHYSICAL EXAM  There were no vitals taken for this visit.    General  - normal appearance, in no obvious distress  Musculoskeletal - Right ring finger  - inspection: no atrophy, normal joint alignment, no swelling  - palpation: tender palmar A1 pulley   - ROM:  MCP 90 deg flexion and 0 deg extension   PIP 90 deg flexion actively and 0 deg extension   DIP 80 deg flexion and 0 deg extension   palpable snap at the A1 pulley with passive flexion of PIP joint, reproducible pain at A1 region.  - strength: 5/5  strength   Neuro  - no numbness, no motor deficit, grossly normal coordination, normal muscle tone  Skin  - no ecchymosis, erythema, warmth, or induration, no obvious rash      IMAGING : Deferred     ASSESSMENT & PLAN  Mr. Hutchins is a 49 year old year old male who presents to clinic today with recurrent right trigger finger despite multiple interventions.    Diagnosis: Stenosing tenosynovitis of right ring finger    Detailed discussion about pathophysiology of stenosing tenosynovitis as well as the recalcitrant nature of his condition.  Given lack of significant improvement with his previous injection in 2024, I did discuss consideration for A1 pulley release surgery and referral to our hand team.  We also discussed additional option to attempt a 1 additional corticosteroid injection, using ultrasound guidance compared to landmark based procedure.  He is interested in trying 1 additional  injection after discussion of risks, benefits, and alternatives.  Lastly we did encourage him to continue using his night splint.  He can follow-up or message us if this is not successful greater than 2 to 3 months.    Trigger Finger Injection - Ultrasound Guided right ring  The patient was informed of the risks and the benefits of the procedure and a written consent was signed.  The patient s right hand was prepped with chlorhexidine in sterile fashion.   20 mg of methylprednisolone suspension was drawn up into a 5 mL syringe with 1 mL of 1% lidocaine.  Injection was performed using sterile technique.  Under ultrasound guidance a 1.5-inch 22-gauge needle was used to enter the palmar aspect of the hand at the flexor tendon of the ring finger.  Needle placement was visualized and documented with ultrasound.  Ultrasound visualization was necessary due to identifying the tendon sheath so as not to inject the tendon itself and avoid injection of the nerves and blood vessels in the palmar area of her hand  Images were permanently stored for the patient's record.  There were no complications. The patient tolerated the procedure well. There was negligible bleeding.   DO MOHIT Sands  Primary Care Sports Medicine  [facility]    It was a pleasure seeing Toshia today.    Richard Cotto DO, CAQSM    Primary Care Sports Medicine  Department of Orthopedic Surgery  [facility]      Hand / Upper Extremity Injection/Arthrocentesis    Date/Time: 3/5/2025 11:53 AM    Performed by: Richard Cotto DO  Authorized by: Richard Cotto DO    Indications:  Pain and tendon swelling  Needle Size:  25 G  Guidance: ultrasound    Approach:  Volar  Condition: trigger finger      Medications:  1 mL lidocaine 1 %; 20 mg methylPREDNISolone 40 MG/ML  Outcome:  Tolerated well, no immediate complications  Procedure discussed: discussed risks, benefits, and alternatives    Consent Given by:   Patient  Timeout: timeout called immediately prior to procedure    Prep: patient was prepped and draped in usual sterile fashion     Ultrasound was used to ensure safe and accurate needle placement and injection. Ultrasound images of the procedure were permanently stored.

## 2025-03-05 NOTE — LETTER
3/5/2025      Toshia Hutchins  [id number]  [place] [address]      Dear Colleague,    Thank you for referring your patient, Toshia Hutchins, to the [facility]. Please see a copy of my visit note below.    CHIEF COMPLAINT:  Pain of the Right Hand     HISTORY OF PRESENT ILLNESS  Mr. Hutchins is a pleasant 49 year old year old male who presents to clinic today with right hand ring finger.  Toshia explains that he previously had an injection in [place] several years ago and more recently saw Dr. Diop for a trigger finger injection but this only lasted 2 months. This was on 9/3/24.    Onset: gradual  Location: right ring finger  Quality:  aching and dull  Duration: 10 years +  Severity: 8/10 at worst  Timing: Intermittent episodes with sleeping, morning  Modifying factors:  resting and non-use makes it better, movement and use makes it worse  Associated signs & symptoms: pain and tenderness  Previous similar pain: No  Treatments to date: Cortisone Injection - 2 months relief, Voltaren, Oval 8 splint    Additional history: as documented    Review of Systems: A 10-point review of systems was obtained and is negative except for as noted in the HPI.       MEDICAL HISTORY  Patient Active Problem List   Diagnosis     Diabetes mellitus, type 2 (H)     Hyperlipidemia due to type 2 diabetes mellitus (H)       Current Outpatient Medications   Medication Sig Dispense Refill     amLODIPine (NORVASC) 5 MG tablet Take 1 tablet (5 mg) by mouth daily. 90 tablet 3     atorvastatin (LIPITOR) 20 MG tablet Take 1 tablet (20 mg) by mouth daily. 90 tablet 3     bisacodyl (DULCOLAX) 5 MG EC tablet Take 2 tablets at 3 pm the day before your procedure. If your procedure is before 11 am, take 2 additional tablets at 11 pm. If your procedure is after 11 am, take 2 additional tablets at 6 am. For additional instructions refer to your colonoscopy prep instructions. 4 tablet 0     metFORMIN (GLUCOPHAGE  XR) 500 MG 24 hr tablet TAKE 1 TABLET BY MOUTH TWICE A DAY WITH MEALS 180 tablet 1     polyethylene glycol (GOLYTELY) 236 g suspension The night before the exam at 6 pm drink an 8-ounce glass every 15 minutes until the jug is half empty. If you arrive before 11 AM: Drink the other half of the Golytely jug at 11 PM night before procedure. If you arrive after 11 AM: Drink the other half of the Golytely jug at 6 AM day of procedure. For additional instructions refer to your colonoscopy prep instructions. (Patient not taking: Reported on 2/7/2025) 4000 mL 0       No Known Allergies    Family History   Problem Relation Age of Onset     No Known Problems Mother      Hypertension Father        Additional medical/Social/Surgical histories reviewed in [facility] and updated as appropriate.       PHYSICAL EXAM  There were no vitals taken for this visit.    General  - normal appearance, in no obvious distress  Musculoskeletal - Right ring finger  - inspection: no atrophy, normal joint alignment, no swelling  - palpation: tender palmar A1 pulley   - ROM:  MCP 90 deg flexion and 0 deg extension   PIP 90 deg flexion actively and 0 deg extension   DIP 80 deg flexion and 0 deg extension   palpable snap at the A1 pulley with passive flexion of PIP joint, reproducible pain at A1 region.  - strength: 5/5  strength   Neuro  - no numbness, no motor deficit, grossly normal coordination, normal muscle tone  Skin  - no ecchymosis, erythema, warmth, or induration, no obvious rash      IMAGING : Deferred     ASSESSMENT & PLAN  Mr. Hutchins is a 49 year old year old male who presents to clinic today with recurrent right trigger finger despite multiple interventions.    Diagnosis: Stenosing tenosynovitis of right ring finger    Detailed discussion about pathophysiology of stenosing tenosynovitis as well as the recalcitrant nature of his condition.  Given lack of significant improvement with his previous injection in 2024, I did discuss consideration  for A1 pulley release surgery and referral to our hand team.  We also discussed additional option to attempt a 1 additional corticosteroid injection, using ultrasound guidance compared to landmark based procedure.  He is interested in trying 1 additional injection after discussion of risks, benefits, and alternatives.  Lastly we did encourage him to continue using his night splint.  He can follow-up or message us if this is not successful greater than 2 to 3 months.    Trigger Finger Injection - Ultrasound Guided right ring  The patient was informed of the risks and the benefits of the procedure and a written consent was signed.  The patient s right hand was prepped with chlorhexidine in sterile fashion.   20 mg of methylprednisolone suspension was drawn up into a 5 mL syringe with 1 mL of 1% lidocaine.  Injection was performed using sterile technique.  Under ultrasound guidance a 1.5-inch 22-gauge needle was used to enter the palmar aspect of the hand at the flexor tendon of the ring finger.  Needle placement was visualized and documented with ultrasound.  Ultrasound visualization was necessary due to identifying the tendon sheath so as not to inject the tendon itself and avoid injection of the nerves and blood vessels in the palmar area of her hand  Images were permanently stored for the patient's record.  There were no complications. The patient tolerated the procedure well. There was negligible bleeding.   DO MOHIT Sands  Primary Care Sports Medicine  [facility]    It was a pleasure seeing Toshia today.    Richard Cotto DO, CAQSM    Primary Care Sports Medicine  Department of Orthopedic Surgery  [facility]      Hand / Upper Extremity Injection/Arthrocentesis    Date/Time: 3/5/2025 11:53 AM    Performed by: Richard Cotto DO  Authorized by: Richard Cotto DO    Indications:  Pain and tendon swelling  Needle Size:  25 G  Guidance: ultrasound     Approach:  Volar  Condition: trigger finger      Medications:  1 mL lidocaine 1 %; 20 mg methylPREDNISolone 40 MG/ML  Outcome:  Tolerated well, no immediate complications  Procedure discussed: discussed risks, benefits, and alternatives    Consent Given by:  Patient  Timeout: timeout called immediately prior to procedure    Prep: patient was prepped and draped in usual sterile fashion     Ultrasound was used to ensure safe and accurate needle placement and injection. Ultrasound images of the procedure were permanently stored.          Again, thank you for allowing me to participate in the care of your patient.        Sincerely,        Richard Cotto DO    Electronically signed

## 2025-03-11 ENCOUNTER — ALLIED HEALTH/NURSE VISIT (OUTPATIENT)
Dept: FAMILY MEDICINE | Facility: CLINIC | Age: 50
End: 2025-03-11
Payer: COMMERCIAL

## 2025-03-11 DIAGNOSIS — Z23 ENCOUNTER FOR IMMUNIZATION: Primary | ICD-10-CM

## 2025-03-11 DIAGNOSIS — Z23 NEED FOR VACCINATION: ICD-10-CM

## 2025-03-11 PROCEDURE — 90471 IMMUNIZATION ADMIN: CPT

## 2025-03-11 PROCEDURE — 90750 HZV VACC RECOMBINANT IM: CPT

## 2025-03-11 PROCEDURE — 90472 IMMUNIZATION ADMIN EACH ADD: CPT

## 2025-03-11 PROCEDURE — 90746 HEPB VACCINE 3 DOSE ADULT IM: CPT

## 2025-03-11 NOTE — PROGRESS NOTES
Prior to immunization administration, verified patients identity using patient s name and date of birth. Please see Immunization Activity for additional information. MIJANINA okay     Is the patient's temperature normal (100.5 or less)? Yes  97.8    Patient MEETS CRITERIA. PROCEED with vaccine administration.          3/11/2025   Hepatitis B   Have you had a serious reaction to a hepatitis B vaccine or to something in a hepatitis B vaccine, including yeast)? No   Are you getting kidney dialysis (either peritoneal or hemodialysis)? No   Do you have an allergy to latex? No         Patient MEETS CRITERIA. PROCEED with vaccine administration.            3/11/2025   Zoster   Have you had a serious reaction to the shingles vaccine or something in the shingles vaccine? No   Do you have shingles now? No   Are you getting treatment for cancer, organ transplant, or bone marrow transplant? No   Do you have an autoimmune or inflammatory condition? No   Is the patient immunocompromised and wanting to complete the Shingles vaccine series in less than 2 months? No   Have you had Guillain-Phoenix syndrome within 6 weeks of getting a vaccine? No         Patient MEETS CRITERIA. PROCEED with vaccine administration.      Patient instructed to remain in clinic for 15 minutes afterwards, and to report any adverse reactions.      Link to Ancillary Visit Immunization Standing Orders SmartSet     Screening performed by Germania Waterman MA on 3/11/2025 at 1:18 PM.

## 2025-03-17 ENCOUNTER — TELEPHONE (OUTPATIENT)
Dept: GASTROENTEROLOGY | Facility: CLINIC | Age: 50
End: 2025-03-17

## 2025-03-17 ENCOUNTER — OFFICE VISIT (OUTPATIENT)
Dept: OPTOMETRY | Facility: CLINIC | Age: 50
End: 2025-03-17
Attending: INTERNAL MEDICINE
Payer: COMMERCIAL

## 2025-03-17 DIAGNOSIS — H52.4 PRESBYOPIA: ICD-10-CM

## 2025-03-17 DIAGNOSIS — E78.5 HYPERLIPIDEMIA DUE TO TYPE 2 DIABETES MELLITUS (H): ICD-10-CM

## 2025-03-17 DIAGNOSIS — E11.69 HYPERLIPIDEMIA DUE TO TYPE 2 DIABETES MELLITUS (H): ICD-10-CM

## 2025-03-17 DIAGNOSIS — H52.222 REGULAR ASTIGMATISM OF LEFT EYE: ICD-10-CM

## 2025-03-17 DIAGNOSIS — E11.9 TYPE 2 DIABETES MELLITUS WITHOUT COMPLICATION, WITHOUT LONG-TERM CURRENT USE OF INSULIN (H): Primary | ICD-10-CM

## 2025-03-17 DIAGNOSIS — H47.392 MYELINATED NERVE FIBERS OF OPTIC DISC OF LEFT EYE: ICD-10-CM

## 2025-03-17 PROCEDURE — 92015 DETERMINE REFRACTIVE STATE: CPT

## 2025-03-17 PROCEDURE — 92004 COMPRE OPH EXAM NEW PT 1/>: CPT

## 2025-03-17 ASSESSMENT — VISUAL ACUITY
OD_SC: 20/20
OD_SC: 20/100
METHOD: SNELLEN - LINEAR
OS_SC+: -1
OS_SC: 20/20
OD_SC+: -1
OS_SC: 20/200

## 2025-03-17 ASSESSMENT — REFRACTION_MANIFEST
OD_ADD: +1.75
OD_SPHERE: +0.00
OS_AXIS: 006
OD_AXIS: 159
OS_ADD: +1.75
OS_SPHERE: +0.00
OD_CYLINDER: SPHERE
OD_CYLINDER: +0.50
METHOD_AUTOREFRACTION: 1
OS_CYLINDER: +0.25
OS_CYLINDER: +0.25
OS_AXIS: 005
OS_SPHERE: +0.25
OD_SPHERE: PLANO

## 2025-03-17 ASSESSMENT — KERATOMETRY
OS_K2POWER_DIOPTERS: 43.00
OS_AXISANGLE2_DEGREES: 164
OD_K1POWER_DIOPTERS: 42.75
OD_K2POWER_DIOPTERS: 43.00
OS_AXISANGLE_DEGREES: 74
OS_K1POWER_DIOPTERS: 42.50
OD_AXISANGLE_DEGREES: 126
OD_AXISANGLE2_DEGREES: 36

## 2025-03-17 ASSESSMENT — TONOMETRY
IOP_METHOD: TONOPEN
OS_IOP_MMHG: 17
OD_IOP_MMHG: 17

## 2025-03-17 ASSESSMENT — SLIT LAMP EXAM - LIDS
COMMENTS: NORMAL
COMMENTS: NORMAL

## 2025-03-17 ASSESSMENT — CONF VISUAL FIELD
OS_INFERIOR_NASAL_RESTRICTION: 0
OS_INFERIOR_TEMPORAL_RESTRICTION: 0
OD_SUPERIOR_NASAL_RESTRICTION: 0
OS_NORMAL: 1
OD_NORMAL: 1
OD_INFERIOR_TEMPORAL_RESTRICTION: 0
OS_SUPERIOR_TEMPORAL_RESTRICTION: 0
OD_SUPERIOR_TEMPORAL_RESTRICTION: 0
OS_SUPERIOR_NASAL_RESTRICTION: 0
OD_INFERIOR_NASAL_RESTRICTION: 0

## 2025-03-17 ASSESSMENT — EXTERNAL EXAM - LEFT EYE: OS_EXAM: NORMAL

## 2025-03-17 ASSESSMENT — CUP TO DISC RATIO
OS_RATIO: 0.35
OD_RATIO: 0.45

## 2025-03-17 ASSESSMENT — EXTERNAL EXAM - RIGHT EYE: OD_EXAM: NORMAL

## 2025-03-17 NOTE — LETTER
3/17/2025       RE: Toshia Hutchins  [id number]  [place] [address]     Dear Colleague,    Thank you for referring your patient, Toshia Hutchins, to the [facility] at . Please see a copy of my visit note below.    Chief Complaint   Patient presents with     Diabetic Eye Exam        Chief Complaint(s) and History of Present Illness(es)       Diabetic Eye Exam              Diabetes Type: Type 2 and taking oral medications    Blood Sugars: is controlled                   Lab Results   Component Value Date    A1C 6.3 02/07/2025    A1C 6.0 12/19/2024    A1C 8.6 08/29/2024     Last Eye Exam: long time ago  Dilated Previously: Yes, side effects of dilation explained today    What are you currently using to see?  does not use glasses or contacts- does wear readers in the morning sometimes    Distance Vision Acuity: Satisfied with vision    Near Vision Acuity: Not satisfied     Eye Comfort: good  Do you use eye drops? : No    Denelle Oswaldo - Optometric Assistant     Medical, surgical and family histories reviewed and updated 3/17/2025.       OBJECTIVE: See Ophthalmology exam    Toshia Hutchins aware  eye exam results will be sent to Abimael Bills.    Assessment/Plan  (E11.9) Type 2 diabetes mellitus without complication, without long-term current use of insulin (H) (E11.69,  E78.5) Hyperlipidemia due to type 2 diabetes mellitus (H) (primary encounter diagnosis)  Plan:   -Patient educated on condition.   -No Diabetic Retinopathy noted in this exam.   -Stressed importance of close BS/BP monitoring, diet/exercise, medication compliance, and regular visits with PCP.   -Discussed getting yearly eye exams.   -Monitor annually.      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  -Monitor.    (H52.222) Regular astigmatism of left eye, (H52.4) Presbyopia  Plan:  -New Glasses Rx Given 03/17/2025. For  Reading Vision Only.  -Discussed that can use OTC Cheaters as well.  -Monitor.     Return to clinic for yearly CEE.    Complete documentation of historical and exam elements from today's encounter can be found in the full encounter summary report (not reduplicated in this progress note). I personally obtained the chief complaint(s) and history of present illness. I confirmed and edited as necessary the review of systems, past medical/surgical history, family history, social history, and examination findings as document by others; and I examined the patient myself. I personally reviewed the relevant tests, images, and reports as documented above. I formulated and edited as necessary the assessment and plan and discussed the findings and management plan with the patient and family.    Jesse Plata OD          Again, thank you for allowing me to participate in the care of your patient.      Sincerely,    Jesse Plata Jr., OD

## 2025-03-17 NOTE — PROGRESS NOTES
Chief Complaint   Patient presents with    Diabetic Eye Exam        Chief Complaint(s) and History of Present Illness(es)       Diabetic Eye Exam              Diabetes Type: Type 2 and taking oral medications    Blood Sugars: is controlled                   Lab Results   Component Value Date    A1C 6.3 02/07/2025    A1C 6.0 12/19/2024    A1C 8.6 08/29/2024     Last Eye Exam: long time ago  Dilated Previously: Yes, side effects of dilation explained today    What are you currently using to see?  does not use glasses or contacts- does wear readers in the morning sometimes    Distance Vision Acuity: Satisfied with vision    Near Vision Acuity: Not satisfied     Eye Comfort: good  Do you use eye drops? : No    Denelle Oswaldo - Optometric Assistant     Medical, surgical and family histories reviewed and updated 3/17/2025.       OBJECTIVE: See Ophthalmology exam    Toshia Hutchins aware  eye exam results will be sent to Abimael Bills.    Assessment/Plan  (E11.9) Type 2 diabetes mellitus without complication, without long-term current use of insulin (H) (E11.69,  E78.5) Hyperlipidemia due to type 2 diabetes mellitus (H) (primary encounter diagnosis)  Plan:   -Patient educated on condition.   -No Diabetic Retinopathy noted in this exam.   -Stressed importance of close BS/BP monitoring, diet/exercise, medication compliance, and regular visits with PCP.   -Discussed getting yearly eye exams.   -Monitor annually.      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  -Monitor.    (H52.222) Regular astigmatism of left eye, (H52.4) Presbyopia  Plan:  -New Glasses Rx Given 03/17/2025. For Reading Vision Only.  -Discussed that can use OTC Cheaters as well.  -Monitor.     Return to clinic for yearly CEE.    Complete documentation of historical and exam elements from today's encounter can be found in the full encounter summary report (not reduplicated in this progress note). I personally obtained the  chief complaint(s) and history of present illness. I confirmed and edited as necessary the review of systems, past medical/surgical history, family history, social history, and examination findings as document by others; and I examined the patient myself. I personally reviewed the relevant tests, images, and reports as documented above. I formulated and edited as necessary the assessment and plan and discussed the findings and management plan with the patient and family.    Jesse Plata OD

## 2025-03-17 NOTE — LETTER
3/17/2025      Toshia Hutchins  [id number]  [facility] [address]      Dear Colleague,    Thank you for referring your patient, Toshia Hutchins, to the [facility]. Please see a copy of my visit note below.    Chief Complaint   Patient presents with     Diabetic Eye Exam        Chief Complaint(s) and History of Present Illness(es)       Diabetic Eye Exam              Diabetes Type: Type 2 and taking oral medications    Blood Sugars: is controlled                   Lab Results   Component Value Date    A1C 6.3 02/07/2025    A1C 6.0 12/19/2024    A1C 8.6 08/29/2024     Last Eye Exam: long time ago  Dilated Previously: Yes, side effects of dilation explained today    What are you currently using to see?  does not use glasses or contacts- does wear readers in the morning sometimes    Distance Vision Acuity: Satisfied with vision    Near Vision Acuity: Not satisfied     Eye Comfort: good  Do you use eye drops? : No    Denelle Oswaldo - Optometric Assistant     Medical, surgical and family histories reviewed and updated 3/17/2025.       OBJECTIVE: See Ophthalmology exam    Toshia Hutchins aware  eye exam results will be sent to Abimael Bills.    Assessment/Plan  (E11.9) Type 2 diabetes mellitus without complication, without long-term current use of insulin (H) (E11.69,  E78.5) Hyperlipidemia due to type 2 diabetes mellitus (H) (primary encounter diagnosis)  Plan:   -Patient educated on condition.   -No Diabetic Retinopathy noted in this exam.   -Stressed importance of close BS/BP monitoring, diet/exercise, medication compliance, and regular visits with PCP.   -Discussed getting yearly eye exams.   -Monitor annually.      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  -Monitor.    (H52.222) Regular astigmatism of left eye, (H52.4) Presbyopia  Plan:  -New Glasses Rx Given 03/17/2025. For Reading Vision Only.  -Discussed that can use OTC Cheaters as  well.  -Monitor.     Return to clinic for yearly CEE.    Complete documentation of historical and exam elements from today's encounter can be found in the full encounter summary report (not reduplicated in this progress note). I personally obtained the chief complaint(s) and history of present illness. I confirmed and edited as necessary the review of systems, past medical/surgical history, family history, social history, and examination findings as document by others; and I examined the patient myself. I personally reviewed the relevant tests, images, and reports as documented above. I formulated and edited as necessary the assessment and plan and discussed the findings and management plan with the patient and family.    Jesse Plata OD          Again, thank you for allowing me to participate in the care of your patient.        Sincerely,        Jesse Plata Jr., OD    Electronically signed

## 2025-03-17 NOTE — TELEPHONE ENCOUNTER
Pre assessment completed for upcoming procedure.   (Please see previous telephone encounter notes for complete details)    Procedure details:    Arrival time and facility location reviewed.    Pre op exam needed? No.    Designated  policy reviewed. Instructed to have someone stay 6  hours post procedure.     Medication review:    Oral diabetic medication(s): Metformin (glucophage): HOLD day of procedure.    Prep for procedure:     Procedure prep instructions reviewed.      Any additional information needed:  N/A    Patient verbalized understanding and had no questions or concerns at this time.      Brianne Spain LPN  Endoscopy Procedure Pre Assessment   [phone] option 3

## 2025-03-17 NOTE — TELEPHONE ENCOUNTER
Rescheduled Colonoscopy  Due to  patient had emergent lap malinda    Pre visit planning completed.    Previous PA call completed 1/20/25 prior to r/s.    Procedure details:    Patient scheduled for Colonoscopy on 4/1/25.     Arrival time: 0915. Procedure time 1000    Facility location: [facility]; [facility] [address] [place] [address]. Check in location: [facility].     Sedation type: Conscious sedation     Pre op exam needed? No.    Indication for procedure: screening      Chart review:     Electronic implanted devices? No    Recent diagnosis of diverticulitis within the last 6 weeks? No      Medication review:    Diabetic? Prediabetic. Oral diabetic medications: Metformin (glucophage): HOLD day of procedure.    Anticoagulants? No    Weight loss medication/injectable? No GLP-1 medication per patient's medication list. Nursing to verify with pre-assessment call.    Other medication HOLDING recommendations:  N/A      Prep for procedure:     Bowel prep recommendation: Standard Golytely. Bowel prep previously sent on 1/20/25 to [facility] [address] [address] [place] [address] [address]  Due to: diabetes    Prep instructions sent via mia Kaur RN  Endoscopy Procedure Pre Assessment   [phone] option 3

## 2025-03-17 NOTE — PATIENT INSTRUCTIONS
The affects of the dilating drops last for 4- 6 hours.  You will be more sensitive to light and vision will be blurry up close.  Do not drive if you do not feel comfortable.  Mydriatic sunglasses were given if needed.     Patient Education   Diabetes weakens the blood vessels all over the body, including the eyes. Damage to the blood vessels in the eyes can cause swelling or bleeding into part of the eye (called the retina). This is called diabetic retinopathy (SAMEERA-tin--pu-thee). If not treated, this disease can cause vision loss or blindness.   Symptoms may include blurred or distorted vision, but many people have no symptoms. It's important to see your eye doctor regularly to check for problems.   Early treatment and good control can help protect your vision. Here are the things you can do to help prevent vision loss:      1. Keep your blood sugar levels under tight control.      2. Bring high blood pressure under control.      3. No smoking.      4. Have yearly dilated eye exams.        Optometry Providers       Clinic Locations                                 Telephone Number   Dr. Rosa Miller   [facility] [phone]     [facility] Hours:                [facility] Hours:       [facility] Hours:   [address] [facility] [address]   [address]     [address] [place] [address]   [place] [address]    [place] [address]  Phone: [phone]                    Phone: [phone]     Phone: [phone]                      Monday 8:00-6:00                          Monday 8:00-6:00                          Monday 8:00-6:00              Tuesday 8:00-6:00                          Tuesday 8:00-6:00                          Tuesday 8:00-6:00              Wednesday 8:00-6:00                   Wednesday 8:00-6:00                   Wednesday 8:00-6:00      Thursday 8:00-6:00                        Thursday 8:00-6:00                         Thursday 8:00-6:00            Friday 8:00-5:00                              Friday 8:00-5:00                              Friday 8:00-5:00    [facility] Hours:   [address] [place] [address]  [phone]    Monday 9:00-6:00  Tuesday 9:00-6:00  Wednesday 9:00-6:00  Thursday 9:00-6:00  Friday 9:00-5:00  As always, Thank you for trusting us with your health care needs!

## 2025-03-27 ENCOUNTER — APPOINTMENT (OUTPATIENT)
Dept: OPTOMETRY | Facility: CLINIC | Age: 50
End: 2025-03-27
Payer: COMMERCIAL

## 2025-03-27 PROCEDURE — 92340 FIT SPECTACLES MONOFOCAL: CPT

## 2025-04-01 ENCOUNTER — HOSPITAL ENCOUNTER (OUTPATIENT)
Facility: CLINIC | Age: 50
Discharge: HOME | End: 2025-04-01
Attending: COLON & RECTAL SURGERY | Admitting: COLON & RECTAL SURGERY
Payer: COMMERCIAL

## 2025-04-01 VITALS
HEART RATE: 67 BPM | OXYGEN SATURATION: 97 % | BODY MASS INDEX: 22.82 KG/M2 | SYSTOLIC BLOOD PRESSURE: 136 MMHG | RESPIRATION RATE: 18 BRPM | DIASTOLIC BLOOD PRESSURE: 87 MMHG | HEIGHT: 66 IN | WEIGHT: 142 LBS

## 2025-04-01 LAB — COLONOSCOPY: NORMAL

## 2025-04-01 PROCEDURE — 88305 TISSUE EXAM BY PATHOLOGIST: CPT | Mod: TC | Performed by: COLON & RECTAL SURGERY

## 2025-04-01 PROCEDURE — 45385 COLONOSCOPY W/LESION REMOVAL: CPT | Performed by: COLON & RECTAL SURGERY

## 2025-04-01 PROCEDURE — 88305 TISSUE EXAM BY PATHOLOGIST: CPT | Mod: 26 | Performed by: PATHOLOGY

## 2025-04-01 PROCEDURE — 250N000011 HC RX IP 250 OP 636: Performed by: COLON & RECTAL SURGERY

## 2025-04-01 PROCEDURE — G0500 MOD SEDAT ENDO SERVICE >5YRS: HCPCS | Performed by: COLON & RECTAL SURGERY

## 2025-04-01 RX ORDER — FLUMAZENIL 0.1 MG/ML
0.2 INJECTION, SOLUTION INTRAVENOUS
Status: DISCONTINUED | OUTPATIENT
Start: 2025-04-01 | End: 2025-04-01 | Stop reason: HOSPADM

## 2025-04-01 RX ORDER — FENTANYL CITRATE 50 UG/ML
INJECTION, SOLUTION INTRAMUSCULAR; INTRAVENOUS PRN
Status: DISCONTINUED | OUTPATIENT
Start: 2025-04-01 | End: 2025-04-01 | Stop reason: HOSPADM

## 2025-04-01 RX ORDER — PROCHLORPERAZINE MALEATE 10 MG
10 TABLET ORAL EVERY 6 HOURS PRN
Status: DISCONTINUED | OUTPATIENT
Start: 2025-04-01 | End: 2025-04-01 | Stop reason: HOSPADM

## 2025-04-01 RX ORDER — LIDOCAINE 40 MG/G
CREAM TOPICAL
Status: DISCONTINUED | OUTPATIENT
Start: 2025-04-01 | End: 2025-04-01 | Stop reason: HOSPADM

## 2025-04-01 RX ORDER — ONDANSETRON 4 MG/1
4 TABLET, ORALLY DISINTEGRATING ORAL EVERY 6 HOURS PRN
Status: DISCONTINUED | OUTPATIENT
Start: 2025-04-01 | End: 2025-04-01 | Stop reason: HOSPADM

## 2025-04-01 RX ORDER — ONDANSETRON 2 MG/ML
4 INJECTION INTRAMUSCULAR; INTRAVENOUS EVERY 6 HOURS PRN
Status: DISCONTINUED | OUTPATIENT
Start: 2025-04-01 | End: 2025-04-01 | Stop reason: HOSPADM

## 2025-04-01 RX ORDER — NALOXONE HYDROCHLORIDE 0.4 MG/ML
0.4 INJECTION, SOLUTION INTRAMUSCULAR; INTRAVENOUS; SUBCUTANEOUS
Status: DISCONTINUED | OUTPATIENT
Start: 2025-04-01 | End: 2025-04-01 | Stop reason: HOSPADM

## 2025-04-01 RX ORDER — ONDANSETRON 2 MG/ML
4 INJECTION INTRAMUSCULAR; INTRAVENOUS
Status: DISCONTINUED | OUTPATIENT
Start: 2025-04-01 | End: 2025-04-01 | Stop reason: HOSPADM

## 2025-04-01 RX ORDER — NALOXONE HYDROCHLORIDE 0.4 MG/ML
0.2 INJECTION, SOLUTION INTRAMUSCULAR; INTRAVENOUS; SUBCUTANEOUS
Status: DISCONTINUED | OUTPATIENT
Start: 2025-04-01 | End: 2025-04-01 | Stop reason: HOSPADM

## 2025-04-01 ASSESSMENT — ACTIVITIES OF DAILY LIVING (ADL)
ADLS_ACUITY_SCORE: 41
ADLS_ACUITY_SCORE: 41

## 2025-04-01 NOTE — H&P
Pre-Endoscopy History and Physical   Toshia Hutchins MRN# [id number]   YOB: 1975 Age: 49 year old   Date of Procedure: 4/1/2025   Primary care provider: Abimael Bills   Type of Endoscopy: colonoscopy   Reason for Procedure: screening   Type of Anesthesia Anticipated: Moderate Sedation   HPI:   Toshia is a 49 year old male for screening colonoscopy.  He has never had a colonoscopy before.  He denies BRBPR, abdominal pain, nausea/vomiting, changes in bowel habits or unintentional weight loss.  He denies a FH of CRC.    No Known Allergies   Prior to Admission Medications   Prescriptions Last Dose Informant Patient Reported? Taking?   amLODIPine (NORVASC) 5 MG tablet Past Week  No Yes   Sig: Take 1 tablet (5 mg) by mouth daily.   atorvastatin (LIPITOR) 20 MG tablet Past Week  No Yes   Sig: Take 1 tablet (20 mg) by mouth daily.   bisacodyl (DULCOLAX) 5 MG EC tablet Past Week  No Yes   Sig: Take 2 tablets at 3 pm the day before your procedure. If your procedure is before 11 am, take 2 additional tablets at 11 pm. If your procedure is after 11 am, take 2 additional tablets at 6 am. For additional instructions refer to your colonoscopy prep instructions.   metFORMIN (GLUCOPHAGE XR) 500 MG 24 hr tablet Past Week  No Yes   Sig: TAKE 1 TABLET BY MOUTH TWICE A DAY WITH MEALS   polyethylene glycol (GOLYTELY) 236 g suspension   No No   Sig: The night before the exam at 6 pm drink an 8-ounce glass every 15 minutes until the jug is half empty. If you arrive before 11 AM: Drink the other half of the Golytely jug at 11 PM night before procedure. If you arrive after 11 AM: Drink the other half of the Golytely jug at 6 AM day of procedure. For additional instructions refer to your colonoscopy prep instructions.   Patient not taking: Reported on 2/7/2025      Facility-Administered Medications: None      Patient Active Problem List   Diagnosis    Diabetes mellitus, type 2 (H)    Hyperlipidemia due to type 2 diabetes mellitus (H)  "     Past Medical History:   Diagnosis Date    Diabetes (H)     Hypertension       Past Surgical History:   Procedure Laterality Date    CHOLECYSTECTOMY        Social History     Tobacco Use    Smoking status: Never     Passive exposure: Never    Smokeless tobacco: Never   Substance Use Topics    Alcohol use: Not Currently     Comment: Sometimes      Family History   Problem Relation Age of Onset    No Known Problems Mother     Hypertension Father       PHYSICAL EXAM:   /89   Pulse 67   Resp 14   Ht 1.676 m (5' 6\")   Wt 64.4 kg (142 lb)   SpO2 98%   BMI 22.92 kg/m   Estimated body mass index is 22.92 kg/m  as calculated from the following:    Height as of this encounter: 1.676 m (5' 6\").    Weight as of this encounter: 64.4 kg (142 lb).   RESP: lungs clear to auscultation - no rales, rhonchi or wheezes   CV: regular rates and rhythm   ASA Class 2 - Mild systemic disease    Assessment: 50 y/o gentleman for screening colonoscopy    Plan: Colonoscopy with moderate sedation.  Risks of the procedure were discussed including, but not limited to, bleeding, perforation and missed lesions.  Patient understands and is willing to proceed.    Callum Thibodeaux MD ....................  4/1/2025   10:22 AM  Colon and Rectal Surgery Staff  [phone]     "

## 2025-04-02 LAB
PATH REPORT.COMMENTS IMP SPEC: NORMAL
PATH REPORT.COMMENTS IMP SPEC: NORMAL
PATH REPORT.FINAL DX SPEC: NORMAL
PATH REPORT.GROSS SPEC: NORMAL
PATH REPORT.MICROSCOPIC SPEC OTHER STN: NORMAL
PATH REPORT.RELEVANT HX SPEC: NORMAL
PHOTO IMAGE: NORMAL

## 2025-04-07 PROBLEM — D12.6 ADENOMATOUS POLYP OF COLON: Status: ACTIVE | Noted: 2025-04-07

## 2025-04-08 ENCOUNTER — PATIENT OUTREACH (OUTPATIENT)
Dept: GASTROENTEROLOGY | Facility: CLINIC | Age: 50
End: 2025-04-08
Payer: COMMERCIAL

## 2025-06-02 ENCOUNTER — TELEPHONE (OUTPATIENT)
Dept: ORTHOPEDICS | Facility: CLINIC | Age: 50
End: 2025-06-02
Payer: COMMERCIAL

## 2025-06-02 DIAGNOSIS — M65.341 TRIGGER RING FINGER OF RIGHT HAND: Primary | ICD-10-CM

## 2025-06-02 NOTE — TELEPHONE ENCOUNTER
Other: Call back about surgery     Toshia is calling in to let Dr Cotto know that the last injection for his trigger finger issue is not helping.   Mentioned that a discussion was had about getting surgery done.  Would like to discuss what is needed, if Dr Cotto could put in a referral for him to see surgical provider.       Toshia indicated that prior history of visits and injections with hand/finger issue before moving to [place] was done at   [facility]  [address] [place] [address]  [address]  [place] [address]  PH: [phone]    Last seen about 4yrs ago.     Could we send this information to you in [facility] or would you prefer to receive a phone call?:   Patient would prefer a phone call   Okay to leave a detailed message?: Yes at Cell number on file:    Telephone Information:   Mobile [phone]

## 2025-06-04 ENCOUNTER — PATIENT OUTREACH (OUTPATIENT)
Dept: CARE COORDINATION | Facility: CLINIC | Age: 50
End: 2025-06-04
Payer: COMMERCIAL

## 2025-06-11 NOTE — TELEPHONE ENCOUNTER
DIAGNOSIS: Trigger ring finger of right hand/Richard Cotto DO/[facility]/NO IMAGES    APPOINTMENT DATE: 6/17/25   NOTES STATUS DETAILS   OFFICE NOTE from referring provider Internal 6/2/25 - Richard Cotto DO    OFFICE NOTE from other specialist Internal 3/5/25 -   Richard Cotto DO   9/3/24 - Raúl Diop MD    MEDICATION LIST Internal

## 2025-06-11 NOTE — PROGRESS NOTES
Chief Complaint:   Chief Complaint   Patient presents with    Consult     Right ring and middle finger trigger fingers       Referring Physician: Richard Cotto    Diagnosis: right ring and middle trigger finger  Treatment:   6/17/2025: right middle trigger finger injection  3/5/2025: right ring trigger finger injection (Dr. Cotto)  9/3/2024: right ring trigger finger injection (Dr. Diop)  Right ring trigger finger injection in [place]    History of Present Illness: Toshia Hutchins is a 49 year old RHD male presenting for evaluation of right hand middle and ring trigger finger. He was last seen for this by Dr. Cotto on 3/5/2025 at which time he underwent an injection into the R ring finger A1 pulley. That didn't help for very long. He says he has more pain in the middle finger than the ring, but the ring finger can lock in the mornings. No numbness/tingling.    Clinical documentation by Dr. Cotto on 3/5/2025 was reviewed.    Occupation:     Past Medical History:   Past Medical History:   Diagnosis Date    Diabetes (H)     Hypertension      Patient Active Problem List   Diagnosis    Diabetes mellitus, type 2 (H)    Hyperlipidemia due to type 2 diabetes mellitus (H)    Adenomatous polyp of colon       Past Surgical History:   Past Surgical History:   Procedure Laterality Date    CHOLECYSTECTOMY      COLONOSCOPY N/A 4/1/2025    Procedure: Colonoscopy;  Surgeon: Callum Thibodeaux MD;  Location:  GI       Medications:   Current Outpatient Medications:     amLODIPine (NORVASC) 5 MG tablet, Take 1 tablet (5 mg) by mouth daily., Disp: 90 tablet, Rfl: 3    atorvastatin (LIPITOR) 20 MG tablet, Take 1 tablet (20 mg) by mouth daily., Disp: 90 tablet, Rfl: 3    bisacodyl (DULCOLAX) 5 MG EC tablet, Take 2 tablets at 3 pm the day before your procedure. If your procedure is before 11 am, take 2 additional tablets at 11 pm. If your procedure is after 11 am, take 2 additional tablets at 6 am. For additional instructions  "refer to your colonoscopy prep instructions., Disp: 4 tablet, Rfl: 0    metFORMIN (GLUCOPHAGE XR) 500 MG 24 hr tablet, TAKE 1 TABLET BY MOUTH TWICE A DAY WITH MEALS, Disp: 180 tablet, Rfl: 1    polyethylene glycol (GOLYTELY) 236 g suspension, The night before the exam at 6 pm drink an 8-ounce glass every 15 minutes until the jug is half empty. If you arrive before 11 AM: Drink the other half of the Golytely jug at 11 PM night before procedure. If you arrive after 11 AM: Drink the other half of the Golytely jug at 6 AM day of procedure. For additional instructions refer to your colonoscopy prep instructions. (Patient not taking: Reported on 2/7/2025), Disp: 4000 mL, Rfl: 0    Allergy: No Known Allergies    Social History:   History   Smoking Status    Never   Smokeless Tobacco    Never      Social History     Tobacco Use    Smoking status: Never     Passive exposure: Never    Smokeless tobacco: Never   Vaping Use    Vaping status: Never Used   Substance Use Topics    Alcohol use: Not Currently     Comment: Sometimes    Drug use: Never        Family History:   Family History   Problem Relation Age of Onset    No Known Problems Mother     Hypertension Father        Physical Examination:  Vitals:    06/17/25 1606   Weight: 66.7 kg (147 lb)   Height: 1.651 m (5' 5\")     Body mass index is 24.46 kg/m .    Well appearing, well nourished  Alert and oriented x 3, cooperative with exam     Right ring finger   TTP at A1 pulley: no    Palpable nodule over flexor tendon: yes    Triggering with active/passive digit flexion/extension: no   Right middle finger   TTP at A1 pulley: yes    Palpable nodule over flexor tendon: yes    Triggering with active/passive digit flexion/extension: no   Motor Exam: Intact TU/OK/x2-3  Sensory Exam: Sensation intact to light touch in FDWS (radial), volar IF (median), volar SF (ulnar)  Vascular Exam: fingers warm, well perfused      Assessment: Toshia Hutchins is a 49 year old male with Right ring and " middle trigger finger.    Plan:   I had a discussion with the patient regarding my clinical findings, diagnosis, and treatment plan. I reviewed the treatment options for trigger digit with the patient (observation, steroid injection, A1 pulley release) as well as the risks and benefits of each.  He elects a steroid injection into the right middle finger. Observation for the right ring finger.  If symptoms have not completely resolved in 6 weeks, a second injection or surgical intervention may be indicated in the form of A1 pulley release.  The patient understands and agrees to the treatment plan.  All questions answered.      Steroid injection given today.  See procedure note below.     Activity as tolerated.    Procedure Note: After a discussion with Toshia Hutchins regarding treatment options, we decided to proceed with a steroid injection to the right middle finger A1 pulley region.  Risks of the procedure including hyperglycemia, fat atrophy, skin depigmentation tendon/ligament weakening, and infection were discussed prior to injection and verbal consent from Toshia Hutchins was obtained. The area was prepped with alcohol.  10  mg Kenalog and 0.5 mL of 1% lidocaine was injected.  Toshia Hutchins tolerated the injection well. A clean dressing was placed over the site of the injection. Toshia Hutchins was given post injection instructions.       Next Visit:   Follow-up: as needed    ABEBE CURRIE MD

## 2025-06-17 ENCOUNTER — PRE VISIT (OUTPATIENT)
Dept: ORTHOPEDICS | Facility: CLINIC | Age: 50
End: 2025-06-17

## 2025-06-17 ENCOUNTER — OFFICE VISIT (OUTPATIENT)
Dept: ORTHOPEDICS | Facility: CLINIC | Age: 50
End: 2025-06-17
Attending: FAMILY MEDICINE
Payer: COMMERCIAL

## 2025-06-17 VITALS — HEIGHT: 65 IN | WEIGHT: 147 LBS | BODY MASS INDEX: 24.49 KG/M2

## 2025-06-17 DIAGNOSIS — M65.341 TRIGGER RING FINGER OF RIGHT HAND: ICD-10-CM

## 2025-06-17 DIAGNOSIS — M65.331 TRIGGER MIDDLE FINGER OF RIGHT HAND: Primary | ICD-10-CM

## 2025-06-17 PROCEDURE — 20550 NJX 1 TENDON SHEATH/LIGAMENT: CPT | Mod: F7 | Performed by: STUDENT IN AN ORGANIZED HEALTH CARE EDUCATION/TRAINING PROGRAM

## 2025-06-17 PROCEDURE — 99203 OFFICE O/P NEW LOW 30 MIN: CPT | Mod: 25 | Performed by: STUDENT IN AN ORGANIZED HEALTH CARE EDUCATION/TRAINING PROGRAM

## 2025-06-17 RX ORDER — LIDOCAINE HYDROCHLORIDE 10 MG/ML
0.5 INJECTION, SOLUTION EPIDURAL; INFILTRATION; INTRACAUDAL; PERINEURAL
Status: COMPLETED | OUTPATIENT
Start: 2025-06-17 | End: 2025-06-17

## 2025-06-17 RX ADMIN — LIDOCAINE HYDROCHLORIDE 0.5 ML: 10 INJECTION, SOLUTION EPIDURAL; INFILTRATION; INTRACAUDAL; PERINEURAL at 16:22

## 2025-06-17 NOTE — LETTER
6/17/2025      Toshia Hutchins  [address]  [facility] [address]      Dear Colleague,    Thank you for referring your patient, Toshia Hutchins, to the [facility] [place]. Please see a copy of my visit note below.    Chief Complaint:   Chief Complaint   Patient presents with     Consult     Right ring and middle finger trigger fingers       Referring Physician: Richard Cotto    Diagnosis: right ring and middle trigger finger  Treatment:   6/17/2025: right middle trigger finger injection  3/5/2025: right ring trigger finger injection (Dr. Cotto)  9/3/2024: right ring trigger finger injection (Dr. Diop)  Right ring trigger finger injection in [place]    History of Present Illness: Toshia Hutchins is a 49 year old RHD male presenting for evaluation of right hand middle and ring trigger finger. He was last seen for this by Dr. Cotto on 3/5/2025 at which time he underwent an injection into the R ring finger A1 pulley. That didn't help for very long. He says he has more pain in the middle finger than the ring, but the ring finger can lock in the mornings. No numbness/tingling.    Clinical documentation by Dr. Cotto on 3/5/2025 was reviewed.    Occupation:     Past Medical History:   Past Medical History:   Diagnosis Date     Diabetes (H)      Hypertension      Patient Active Problem List   Diagnosis     Diabetes mellitus, type 2 (H)     Hyperlipidemia due to type 2 diabetes mellitus (H)     Adenomatous polyp of colon       Past Surgical History:   Past Surgical History:   Procedure Laterality Date     CHOLECYSTECTOMY       COLONOSCOPY N/A 4/1/2025    Procedure: Colonoscopy;  Surgeon: Callum Thibodeaux MD;  Location:  GI       Medications:   Current Outpatient Medications:      amLODIPine (NORVASC) 5 MG tablet, Take 1 tablet (5 mg) by mouth daily., Disp: 90 tablet, Rfl: 3     atorvastatin (LIPITOR) 20 MG tablet, Take 1 tablet (20 mg) by mouth daily., Disp: 90 tablet,  "Rfl: 3     bisacodyl (DULCOLAX) 5 MG EC tablet, Take 2 tablets at 3 pm the day before your procedure. If your procedure is before 11 am, take 2 additional tablets at 11 pm. If your procedure is after 11 am, take 2 additional tablets at 6 am. For additional instructions refer to your colonoscopy prep instructions., Disp: 4 tablet, Rfl: 0     metFORMIN (GLUCOPHAGE XR) 500 MG 24 hr tablet, TAKE 1 TABLET BY MOUTH TWICE A DAY WITH MEALS, Disp: 180 tablet, Rfl: 1     polyethylene glycol (GOLYTELY) 236 g suspension, The night before the exam at 6 pm drink an 8-ounce glass every 15 minutes until the jug is half empty. If you arrive before 11 AM: Drink the other half of the Golytely jug at 11 PM night before procedure. If you arrive after 11 AM: Drink the other half of the Golytely jug at 6 AM day of procedure. For additional instructions refer to your colonoscopy prep instructions. (Patient not taking: Reported on 2/7/2025), Disp: 4000 mL, Rfl: 0    Allergy: No Known Allergies    Social History:   History   Smoking Status     Never   Smokeless Tobacco     Never      Social History     Tobacco Use     Smoking status: Never     Passive exposure: Never     Smokeless tobacco: Never   Vaping Use     Vaping status: Never Used   Substance Use Topics     Alcohol use: Not Currently     Comment: Sometimes     Drug use: Never        Family History:   Family History   Problem Relation Age of Onset     No Known Problems Mother      Hypertension Father        Physical Examination:  Vitals:    06/17/25 1606   Weight: 66.7 kg (147 lb)   Height: 1.651 m (5' 5\")     Body mass index is 24.46 kg/m .    Well appearing, well nourished  Alert and oriented x 3, cooperative with exam     Right ring finger    TTP at A1 pulley: no     Palpable nodule over flexor tendon: yes     Triggering with active/passive digit flexion/extension: no   Right middle finger    TTP at A1 pulley: yes     Palpable nodule over flexor tendon: yes     Triggering with " active/passive digit flexion/extension: no   Motor Exam: Intact TU/OK/x2-3  Sensory Exam: Sensation intact to light touch in FDWS (radial), volar IF (median), volar SF (ulnar)  Vascular Exam: fingers warm, well perfused      Assessment: Toshia Hutchins is a 49 year old male with Right ring and middle trigger finger.    Plan:   I had a discussion with the patient regarding my clinical findings, diagnosis, and treatment plan. I reviewed the treatment options for trigger digit with the patient (observation, steroid injection, A1 pulley release) as well as the risks and benefits of each.  He elects a steroid injection into the right middle finger. Observation for the right ring finger.  If symptoms have not completely resolved in 6 weeks, a second injection or surgical intervention may be indicated in the form of A1 pulley release.  The patient understands and agrees to the treatment plan.  All questions answered.       Steroid injection given today.  See procedure note below.      Activity as tolerated.    Procedure Note: After a discussion with Toshia Hutchins regarding treatment options, we decided to proceed with a steroid injection to the right middle finger A1 pulley region.  Risks of the procedure including hyperglycemia, fat atrophy, skin depigmentation tendon/ligament weakening, and infection were discussed prior to injection and verbal consent from Toshia Hutchins was obtained. The area was prepped with alcohol.  10  mg Kenalog and 0.5 mL of 1% lidocaine was injected.  Toshia Hutchins tolerated the injection well. A clean dressing was placed over the site of the injection. Toshia Hutchins was given post injection instructions.       Next Visit:    Follow-up: as needed    YOON CURRIE MD      Hand / Upper Extremity Injection/Arthrocentesis: R long A1    Date/Time: 6/17/2025 4:22 PM    Performed by: Yoon Currie MD  Authorized by: Yoon Currie MD    Indications:  Pain  Needle Size:  25 G  Guidance: landmark     Condition: trigger finger    Location:  Long finger    Site:  R long A1  Medications:  10 mg triamcinolone acetonide 10 MG/ML; 0.5 mL lidocaine (PF) 1 %  Outcome:  Tolerated well, no immediate complications  Procedure discussed: discussed risks, benefits, and alternatives    Consent Given by:  Patient  Timeout: timeout called immediately prior to procedure    Prep: patient was prepped and draped in usual sterile fashion        [facility] [address]  4TH FLOOR  [facility] [address] [phone]  Dept: [phone]  ______________________________________________________________________________    Patient: Toshia Hutchins   : 1975   MRN: [id number]   2025    INVASIVE PROCEDURE SAFETY CHECKLIST    Date: 2025   Procedure: Right middle trigger finger steroid injection  Patient Name: Toshia Hutchins  MRN: [id number]  YOB: 1975    Action: Complete sections as appropriate. Any discrepancy results in a HARD COPY until resolved.     PRE PROCEDURE:  Patient ID verified with 2 identifiers (name and  or MRN): Yes  Procedure and site verified with patient/designee (when able): Yes  Accurate consent documentation in medical record: Yes  H&P (or appropriate assessment) documented in medical record: Yes  H&P must be up to 20 days prior to procedure and updates within 24 hours of procedure as applicable: Yes  Relevant diagnostic and radiology test results appropriately labeled and displayed as applicable: NA  Procedure site(s) marked with provider initials: NA    TIMEOUT:  Time-Out performed immediately prior to starting procedure, including verbal and active participation of all team members addressing the following:Yes  * Correct patient identify  * Confirmed that the correct side and site are marked  * An accurate procedure consent form  * Agreement on the procedure to be done  * Correct patient position  * Relevant images and results are  properly labeled and appropriately displayed  * The need to administer antibiotics or fluids for irrigation purposes during the procedure as applicable   * Safety precautions based on patient history or medication use    DURING PROCEDURE: Verification of correct person, site, and procedures any time the responsibility for care of the patient is transferred to another member of the care team.       The following medications were given:         Prior to injection, verified patient identity using patient's name and date of birth.  Due to injection administration, patient instructed to remain in clinic for 15 minutes  afterwards, and to report any adverse reaction to me immediately.    Trigger point injection was performed.   Medication Name: Kenalog 10 NDC 1671-5314-09  Drug Amount Wasted:  Yes: 40 mg/ml   Vial/Syringe: Single dose vial  Expiration Date:  9/1/127    Medication Name Lidocaine 1% NDC 24120-034-84    Scribed by Lillian Shepard ATC for Dr. Enriquez on June 17, 2025 at 4:24pm based on the provider's statements to me.     Lillian Shepard ATC      Again, thank you for allowing me to participate in the care of your patient.        Sincerely,        ABEBE CURRIE MD    Electronically signed

## 2025-06-17 NOTE — NURSING NOTE
"Reason For Visit:   Chief Complaint   Patient presents with    Consult     Right ring and middle finger trigger fingers       Primary MD: Abimael Bills  Ref. MD: Malvin    Age: 49 year old    ?  No      Ht 1.651 m (5' 5\")   Wt 66.7 kg (147 lb)   BMI 24.46 kg/m        Pain Assessment  Patient Currently in Pain: No    Hand Dominance Evaluation  Hand Dominance: Right          QuickDASH Assessment      6/17/2025     1:33 PM   QuickDASH Main   1. Open a tight or new jar No difficulty   2. Do heavy household chores (e.g., wash walls, floors) No difficulty   3. Carry a shopping bag or briefcase No difficulty   4. Wash your back No difficulty   5. Use a knife to cut food Mild difficulty   6. Recreational activities in which you take some force or impact through your arm, shoulder or hand (e.g., golf, hammering, tennis, etc.) No difficulty   7. During the past week, to what extent has your arm, shoulder or hand problem interfered with your normal social activities with family, friends, neighbours or groups Moderately   8. During the past week, were you limited in your work or other regular daily activities as a result of your arm, shoulder or hand problem Not limited at all   9. Arm, shoulder or hand pain Mild   10.Tingling (pins and needles) in your arm,shoulder or hand Mild   11. During the past week, how much difficulty have you had sleeping because of the pain in your arm, shoulder or hand Moderate difficulty   Quickdash Ability Score 15.91          Current Outpatient Medications   Medication Sig Dispense Refill    amLODIPine (NORVASC) 5 MG tablet Take 1 tablet (5 mg) by mouth daily. 90 tablet 3    atorvastatin (LIPITOR) 20 MG tablet Take 1 tablet (20 mg) by mouth daily. 90 tablet 3    bisacodyl (DULCOLAX) 5 MG EC tablet Take 2 tablets at 3 pm the day before your procedure. If your procedure is before 11 am, take 2 additional tablets at 11 pm. If your procedure is after 11 am, take 2 additional tablets at 6 am. For " additional instructions refer to your colonoscopy prep instructions. 4 tablet 0    metFORMIN (GLUCOPHAGE XR) 500 MG 24 hr tablet TAKE 1 TABLET BY MOUTH TWICE A DAY WITH MEALS 180 tablet 1    polyethylene glycol (GOLYTELY) 236 g suspension The night before the exam at 6 pm drink an 8-ounce glass every 15 minutes until the jug is half empty. If you arrive before 11 AM: Drink the other half of the Golytely jug at 11 PM night before procedure. If you arrive after 11 AM: Drink the other half of the Golytely jug at 6 AM day of procedure. For additional instructions refer to your colonoscopy prep instructions. (Patient not taking: Reported on 2/7/2025) 4000 mL 0       No Known Allergies    Lillian Shepard, ATC

## 2025-06-17 NOTE — PROGRESS NOTES
Hand / Upper Extremity Injection/Arthrocentesis: R long A1    Date/Time: 2025 4:22 PM    Performed by: Yoon Hernandez MD  Authorized by: Yoon Hernandez MD    Indications:  Pain  Needle Size:  25 G  Guidance: landmark    Condition: trigger finger    Location:  Long finger    Site:  R long A1  Medications:  10 mg triamcinolone acetonide 10 MG/ML; 0.5 mL lidocaine (PF) 1 %  Outcome:  Tolerated well, no immediate complications  Procedure discussed: discussed risks, benefits, and alternatives    Consent Given by:  Patient  Timeout: timeout called immediately prior to procedure    Prep: patient was prepped and draped in usual sterile fashion        [facility] [address]  4TH [facility] [id number] [phone]  Dept: [phone]  ______________________________________________________________________________    Patient: Toshia Hutchins   : 1975   MRN: [id number]   2025    INVASIVE PROCEDURE SAFETY CHECKLIST    Date: 2025   Procedure: Right middle trigger finger steroid injection  Patient Name: Toshia Hutchins  MRN: [id number]  YOB: 1975    Action: Complete sections as appropriate. Any discrepancy results in a HARD COPY until resolved.     PRE PROCEDURE:  Patient ID verified with 2 identifiers (name and  or MRN): Yes  Procedure and site verified with patient/designee (when able): Yes  Accurate consent documentation in medical record: Yes  H&P (or appropriate assessment) documented in medical record: Yes  H&P must be up to 20 days prior to procedure and updates within 24 hours of procedure as applicable: Yes  Relevant diagnostic and radiology test results appropriately labeled and displayed as applicable: NA  Procedure site(s) marked with provider initials: NA    TIMEOUT:  Time-Out performed immediately prior to starting procedure, including verbal and active participation of all team members addressing the  following:Yes  * Correct patient identify  * Confirmed that the correct side and site are marked  * An accurate procedure consent form  * Agreement on the procedure to be done  * Correct patient position  * Relevant images and results are properly labeled and appropriately displayed  * The need to administer antibiotics or fluids for irrigation purposes during the procedure as applicable   * Safety precautions based on patient history or medication use    DURING PROCEDURE: Verification of correct person, site, and procedures any time the responsibility for care of the patient is transferred to another member of the care team.       The following medications were given:         Prior to injection, verified patient identity using patient's name and date of birth.  Due to injection administration, patient instructed to remain in clinic for 15 minutes  afterwards, and to report any adverse reaction to me immediately.    Trigger point injection was performed.   Medication Name: Kenalog 10 NDC 8172-0668-41  Drug Amount Wasted:  Yes: 40 mg/ml   Vial/Syringe: Single dose vial  Expiration Date:  9/1/127    Medication Name Lidocaine 1% NDC 68273-648-96    Scribed by Lillian Shepard ATC for Dr. Enriquez on June 17, 2025 at 4:24pm based on the provider's statements to me.     Lillian Shepard ATC

## 2025-06-25 ENCOUNTER — DOCUMENTATION ONLY (OUTPATIENT)
Dept: LAB | Facility: CLINIC | Age: 50
End: 2025-06-25
Payer: COMMERCIAL

## 2025-06-25 DIAGNOSIS — E11.9 TYPE 2 DIABETES MELLITUS WITHOUT COMPLICATION, WITHOUT LONG-TERM CURRENT USE OF INSULIN (H): Primary | ICD-10-CM

## 2025-06-25 NOTE — PROGRESS NOTES
Toshia Hutchins has an upcoming lab appointment:    Future Appointments   Date Time Provider Department Center   7/14/2025 11:30 AM BA LAB ONLY [facility]   7/18/2025  9:00 AM Abimael Bills MD PhD [facility]   2/6/2026 10:30 AM Abimael Bills MD PhD [facility]     Patient is scheduled for the following lab(s):     There is no order available. Please review and place either future orders or HMPO (Review of Health Maintenance Protocol Orders), as appropriate.    There are no preventive care reminders to display for this patient.  Eleanor Villa MLT

## 2025-07-14 ENCOUNTER — LAB (OUTPATIENT)
Dept: LAB | Facility: CLINIC | Age: 50
End: 2025-07-14
Payer: COMMERCIAL

## 2025-07-14 DIAGNOSIS — E11.9 TYPE 2 DIABETES MELLITUS WITHOUT COMPLICATION, WITHOUT LONG-TERM CURRENT USE OF INSULIN (H): ICD-10-CM

## 2025-07-14 LAB
ALBUMIN SERPL BCG-MCNC: 4.7 G/DL (ref 3.5–5.2)
ALP SERPL-CCNC: 55 U/L (ref 40–150)
ALT SERPL W P-5'-P-CCNC: 27 U/L (ref 0–70)
ANION GAP SERPL CALCULATED.3IONS-SCNC: 9 MMOL/L (ref 7–15)
AST SERPL W P-5'-P-CCNC: 27 U/L (ref 0–45)
BILIRUB SERPL-MCNC: 0.7 MG/DL
BUN SERPL-MCNC: 12.8 MG/DL (ref 6–20)
CALCIUM SERPL-MCNC: 9.5 MG/DL (ref 8.8–10.4)
CHLORIDE SERPL-SCNC: 102 MMOL/L (ref 98–107)
CREAT SERPL-MCNC: 0.78 MG/DL (ref 0.67–1.17)
EGFRCR SERPLBLD CKD-EPI 2021: >90 ML/MIN/1.73M2
GLUCOSE SERPL-MCNC: 127 MG/DL (ref 70–99)
HCO3 SERPL-SCNC: 27 MMOL/L (ref 22–29)
POTASSIUM SERPL-SCNC: 4.2 MMOL/L (ref 3.4–5.3)
PROT SERPL-MCNC: 7 G/DL (ref 6.4–8.3)
SODIUM SERPL-SCNC: 138 MMOL/L (ref 135–145)

## 2025-07-14 PROCEDURE — 80053 COMPREHEN METABOLIC PANEL: CPT

## 2025-07-14 PROCEDURE — 36415 COLL VENOUS BLD VENIPUNCTURE: CPT

## 2025-07-18 ENCOUNTER — OFFICE VISIT (OUTPATIENT)
Dept: FAMILY MEDICINE | Facility: CLINIC | Age: 50
End: 2025-07-18
Payer: COMMERCIAL

## 2025-07-18 VITALS
WEIGHT: 155.6 LBS | HEIGHT: 67 IN | SYSTOLIC BLOOD PRESSURE: 127 MMHG | TEMPERATURE: 97 F | OXYGEN SATURATION: 99 % | RESPIRATION RATE: 16 BRPM | BODY MASS INDEX: 24.42 KG/M2 | DIASTOLIC BLOOD PRESSURE: 74 MMHG | HEART RATE: 70 BPM

## 2025-07-18 DIAGNOSIS — E78.5 HYPERLIPIDEMIA LDL GOAL <100: ICD-10-CM

## 2025-07-18 DIAGNOSIS — E11.9 TYPE 2 DIABETES MELLITUS WITHOUT COMPLICATION, WITHOUT LONG-TERM CURRENT USE OF INSULIN (H): Primary | ICD-10-CM

## 2025-07-18 LAB
CHOLEST SERPL-MCNC: 225 MG/DL
CREAT UR-MCNC: 59.3 MG/DL
EST. AVERAGE GLUCOSE BLD GHB EST-MCNC: 123 MG/DL
FASTING STATUS PATIENT QL REPORTED: YES
HBA1C MFR BLD: 5.9 % (ref 0–5.6)
HDLC SERPL-MCNC: 57 MG/DL
LDLC SERPL CALC-MCNC: 147 MG/DL
MICROALBUMIN UR-MCNC: <12 MG/L
MICROALBUMIN/CREAT UR: NORMAL MG/G{CREAT}
NONHDLC SERPL-MCNC: 168 MG/DL
TRIGL SERPL-MCNC: 106 MG/DL

## 2025-07-18 PROCEDURE — 36415 COLL VENOUS BLD VENIPUNCTURE: CPT | Performed by: INTERNAL MEDICINE

## 2025-07-18 PROCEDURE — 82570 ASSAY OF URINE CREATININE: CPT | Performed by: INTERNAL MEDICINE

## 2025-07-18 PROCEDURE — 3078F DIAST BP <80 MM HG: CPT | Performed by: INTERNAL MEDICINE

## 2025-07-18 PROCEDURE — 99214 OFFICE O/P EST MOD 30 MIN: CPT | Performed by: INTERNAL MEDICINE

## 2025-07-18 PROCEDURE — 3074F SYST BP LT 130 MM HG: CPT | Performed by: INTERNAL MEDICINE

## 2025-07-18 PROCEDURE — 82043 UR ALBUMIN QUANTITATIVE: CPT | Performed by: INTERNAL MEDICINE

## 2025-07-18 PROCEDURE — 83036 HEMOGLOBIN GLYCOSYLATED A1C: CPT | Performed by: INTERNAL MEDICINE

## 2025-07-18 PROCEDURE — G2211 COMPLEX E/M VISIT ADD ON: HCPCS | Mod: 25 | Performed by: INTERNAL MEDICINE

## 2025-07-18 PROCEDURE — 1126F AMNT PAIN NOTED NONE PRSNT: CPT | Performed by: INTERNAL MEDICINE

## 2025-07-18 PROCEDURE — 80061 LIPID PANEL: CPT | Performed by: INTERNAL MEDICINE

## 2025-07-18 PROCEDURE — 3050F LDL-C >= 130 MG/DL: CPT | Performed by: INTERNAL MEDICINE

## 2025-07-18 PROCEDURE — 3044F HG A1C LEVEL LT 7.0%: CPT | Performed by: INTERNAL MEDICINE

## 2025-07-18 RX ORDER — METFORMIN HYDROCHLORIDE 500 MG/1
500 TABLET, EXTENDED RELEASE ORAL
Qty: 90 TABLET | Refills: 1 | Status: SHIPPED | OUTPATIENT
Start: 2025-07-18

## 2025-07-18 ASSESSMENT — PAIN SCALES - GENERAL: PAINLEVEL_OUTOF10: NO PAIN (0)

## 2025-07-18 NOTE — PROGRESS NOTES
Assessment & Plan     Assessment & Plan    Type 2 diabetes mellitus without complication, without long-term current use of insulin:  - Diabetes managed with metformin once daily. Blood sugar levels are good, ranging from 110 to 120.  - Continue metformin once daily. Check A1c today, as it does not require fasting. Follow-up appointment moved to January to ensure medication does not run out.    Hyperlipidemia LDL goal <100:  - Cholesterol checked in December 2024; next check due in December 2025.  - No need to recheck cholesterol now. Cholesterol medication refilled for a whole year until July.    Infertility:  - Wife's gynecologist has tested her, and results are normal. Patient has not had a sperm count test.  - Patient advised to contact wife's doctor to request a sperm count test. If unable to obtain test through wife's doctor, patient can request a referral to a urologist via [employer] message.     Follow-up  Return in about 6 months (around 1/7/2026) for Physical Exam, Fasting Lab appointment.    The longitudinal plan of care for the diagnosis(es)/condition(s) as documented were addressed during this visit. Due to the added complexity in care, I will continue to support Toshia in the subsequent management and with ongoing continuity of care.      Basilio Pope is a 50 year old, presenting for the following health issues:  Diabetes (Labs please)        7/18/2025     8:38 AM   Additional Questions   Roomed by Sanam Pope SARAH Hutchins is a 50 year old male who does not have any pertinent problems on file.       History of Present Illness       Diabetes:   He presents for follow up of diabetes.  He is checking home blood glucose a few times a month.   He checks blood glucose before meals.  Blood glucose is never over 200 and never under 70.  When his blood glucose is low, the patient is asymptomatic for confusion, blurred vision, lethargy and reports not feeling dizzy, shaky, or weak.   He has no concerns  "regarding his diabetes at this time.   He is not experiencing numbness or burning in feet, excessive thirst, blurry vision, weight changes or redness, sores or blisters on feet.           He eats 0-1 servings of fruits and vegetables daily.He consumes 0 sweetened beverage(s) daily.He exercises with enough effort to increase his heart rate 30 to 60 minutes per day.  He exercises with enough effort to increase his heart rate 3 or less days per week.   He is taking medications regularly.  He I saw the Toshia Hutchins, 50-year-old male  - History of diabetes for several years, currently taking metformin once daily  - Reports home blood pressure readings between 127/74.with higher readings only at clinic visits  - Difficulty conceiving with wife for 8 years; wife age 38, her gynecological evaluation normal, no children together  - Never had a sperm analysis performed  - Reports cholesterol checked in December 24, 2024 and January 2025  - Reports A1c not drawn at recent lab visit due to late order  - Reports kidney function and electrolytes previously checked and normal  - History of gallbladder removal in January 2025, with significant pain prior to removal, pain resolved after procedure  - Reports previous child passed away due to health complications          Review of Systems  Constitutional, HEENT, cardiovascular, pulmonary, gi and gu systems are negative, except as otherwise noted.      Objective    /74 (BP Location: Right arm, Patient Position: Sitting, Cuff Size: Adult Regular)   Pulse 70   Temp 97  F (36.1  C) (Temporal)   Resp 16   Ht 1.69 m (5' 6.54\")   Wt 70.6 kg (155 lb 9.6 oz)   SpO2 99%   BMI 24.71 kg/m    Body mass index is 24.71 kg/m .  Physical Exam   GENERAL: alert and no distress    Lab on 07/14/2025   Component Date Value Ref Range Status    Sodium 07/14/2025 138  135 - 145 mmol/L Final    Potassium 07/14/2025 4.2  3.4 - 5.3 mmol/L Final    Carbon Dioxide (CO2) 07/14/2025 27  22 - 29 mmol/L " Final    Anion Gap 07/14/2025 9  7 - 15 mmol/L Final    Urea Nitrogen 07/14/2025 12.8  6.0 - 20.0 mg/dL Final    Creatinine 07/14/2025 0.78  0.67 - 1.17 mg/dL Final    GFR Estimate 07/14/2025 >90  >60 mL/min/1.73m2 Final    eGFR calculated using 2021 CKD-EPI equation.    Calcium 07/14/2025 9.5  8.8 - 10.4 mg/dL Final    Chloride 07/14/2025 102  98 - 107 mmol/L Final    Glucose 07/14/2025 127 (H)  70 - 99 mg/dL Final    Alkaline Phosphatase 07/14/2025 55  40 - 150 U/L Final    AST 07/14/2025 27  0 - 45 U/L Final    ALT 07/14/2025 27  0 - 70 U/L Final    Protein Total 07/14/2025 7.0  6.4 - 8.3 g/dL Final    Albumin 07/14/2025 4.7  3.5 - 5.2 g/dL Final    Bilirubin Total 07/14/2025 0.7  <=1.2 mg/dL Final       Results for orders placed or performed in visit on 07/18/25   **Hemoglobin A1c FUTURE 6mo     Status: Abnormal   Result Value Ref Range    Estimated Average Glucose 123 (H) <117 mg/dL    Hemoglobin A1C 5.9 (H) 0.0 - 5.6 %             Signed Electronically by: Abimael Bills MD PhD  Chart documentation was done in part with Dragon Voice Recognition software and AI tools. Although reviewed after completion, some word and grammatical errors may remain.

## (undated) DEVICE — SOL WATER IRRIG 1000ML BOTTLE 2F7114

## (undated) DEVICE — BAG DECANTER STERILE WHITE DYNJDEC09

## (undated) RX ORDER — LIDOCAINE HYDROCHLORIDE 10 MG/ML
INJECTION, SOLUTION EPIDURAL; INFILTRATION; INTRACAUDAL; PERINEURAL
Status: DISPENSED
Start: 2025-06-17

## (undated) RX ORDER — FENTANYL CITRATE 50 UG/ML
INJECTION, SOLUTION INTRAMUSCULAR; INTRAVENOUS
Status: DISPENSED
Start: 2025-04-01